# Patient Record
Sex: FEMALE | Race: WHITE | NOT HISPANIC OR LATINO | URBAN - METROPOLITAN AREA
[De-identification: names, ages, dates, MRNs, and addresses within clinical notes are randomized per-mention and may not be internally consistent; named-entity substitution may affect disease eponyms.]

---

## 2019-03-11 ENCOUNTER — INPATIENT (INPATIENT)
Facility: HOSPITAL | Age: 79
LOS: 2 days | Discharge: HOME | DRG: 247 | End: 2019-03-14
Attending: INTERNAL MEDICINE | Admitting: INTERNAL MEDICINE
Payer: COMMERCIAL

## 2019-03-11 VITALS
OXYGEN SATURATION: 98 % | DIASTOLIC BLOOD PRESSURE: 77 MMHG | HEART RATE: 64 BPM | TEMPERATURE: 98 F | RESPIRATION RATE: 18 BRPM | SYSTOLIC BLOOD PRESSURE: 129 MMHG

## 2019-03-11 DIAGNOSIS — Z90.49 ACQUIRED ABSENCE OF OTHER SPECIFIED PARTS OF DIGESTIVE TRACT: Chronic | ICD-10-CM

## 2019-03-11 DIAGNOSIS — I21.4 NON-ST ELEVATION (NSTEMI) MYOCARDIAL INFARCTION: ICD-10-CM

## 2019-03-11 PROBLEM — Z00.00 ENCOUNTER FOR PREVENTIVE HEALTH EXAMINATION: Status: ACTIVE | Noted: 2019-03-11

## 2019-03-11 LAB
ALBUMIN SERPL ELPH-MCNC: 3.4 G/DL — SIGNIFICANT CHANGE UP (ref 3.3–5)
ALP SERPL-CCNC: 93 U/L — SIGNIFICANT CHANGE UP (ref 40–120)
ALT FLD-CCNC: 12 U/L — SIGNIFICANT CHANGE UP (ref 10–45)
ANION GAP SERPL CALC-SCNC: 12 MMOL/L — SIGNIFICANT CHANGE UP (ref 5–17)
APTT BLD: 74.2 SEC — HIGH (ref 27.5–36.3)
AST SERPL-CCNC: 18 U/L — SIGNIFICANT CHANGE UP (ref 10–40)
BILIRUB SERPL-MCNC: 0.5 MG/DL — SIGNIFICANT CHANGE UP (ref 0.2–1.2)
BUN SERPL-MCNC: 9 MG/DL — SIGNIFICANT CHANGE UP (ref 7–23)
C DIFF GDH STL QL: NEGATIVE — SIGNIFICANT CHANGE UP
C DIFF GDH STL QL: SIGNIFICANT CHANGE UP
CALCIUM SERPL-MCNC: 8.3 MG/DL — LOW (ref 8.4–10.5)
CHLORIDE SERPL-SCNC: 110 MMOL/L — HIGH (ref 96–108)
CHOLEST SERPL-MCNC: 143 MG/DL — SIGNIFICANT CHANGE UP (ref 10–199)
CO2 SERPL-SCNC: 22 MMOL/L — SIGNIFICANT CHANGE UP (ref 22–31)
CREAT SERPL-MCNC: 0.63 MG/DL — SIGNIFICANT CHANGE UP (ref 0.5–1.3)
GLUCOSE SERPL-MCNC: 108 MG/DL — HIGH (ref 70–99)
HCT VFR BLD CALC: 36.7 % — SIGNIFICANT CHANGE UP (ref 34.5–45)
HDLC SERPL-MCNC: 46 MG/DL — LOW
HGB BLD-MCNC: 12.5 G/DL — SIGNIFICANT CHANGE UP (ref 11.5–15.5)
INR BLD: 1.11 RATIO — SIGNIFICANT CHANGE UP (ref 0.88–1.16)
LIPID PNL WITH DIRECT LDL SERPL: 78 MG/DL — SIGNIFICANT CHANGE UP
MAGNESIUM SERPL-MCNC: 2 MG/DL — SIGNIFICANT CHANGE UP (ref 1.6–2.6)
MCHC RBC-ENTMCNC: 30.6 PG — SIGNIFICANT CHANGE UP (ref 27–34)
MCHC RBC-ENTMCNC: 34.1 GM/DL — SIGNIFICANT CHANGE UP (ref 32–36)
MCV RBC AUTO: 89.9 FL — SIGNIFICANT CHANGE UP (ref 80–100)
PLATELET # BLD AUTO: 210 K/UL — SIGNIFICANT CHANGE UP (ref 150–400)
POTASSIUM SERPL-MCNC: 3.7 MMOL/L — SIGNIFICANT CHANGE UP (ref 3.5–5.3)
POTASSIUM SERPL-SCNC: 3.7 MMOL/L — SIGNIFICANT CHANGE UP (ref 3.5–5.3)
PROT SERPL-MCNC: 5.8 G/DL — LOW (ref 6–8.3)
PROTHROM AB SERPL-ACNC: 12.7 SEC — SIGNIFICANT CHANGE UP (ref 10–12.9)
RBC # BLD: 4.08 M/UL — SIGNIFICANT CHANGE UP (ref 3.8–5.2)
RBC # FLD: 12.5 % — SIGNIFICANT CHANGE UP (ref 10.3–14.5)
SODIUM SERPL-SCNC: 144 MMOL/L — SIGNIFICANT CHANGE UP (ref 135–145)
TOTAL CHOLESTEROL/HDL RATIO MEASUREMENT: 3.1 RATIO — LOW (ref 3.3–7.1)
TRIGL SERPL-MCNC: 97 MG/DL — SIGNIFICANT CHANGE UP (ref 10–149)
WBC # BLD: 12.5 K/UL — HIGH (ref 3.8–10.5)
WBC # FLD AUTO: 12.5 K/UL — HIGH (ref 3.8–10.5)

## 2019-03-11 PROCEDURE — 93010 ELECTROCARDIOGRAM REPORT: CPT

## 2019-03-11 PROCEDURE — 99152 MOD SED SAME PHYS/QHP 5/>YRS: CPT | Mod: GC

## 2019-03-11 PROCEDURE — 93458 L HRT ARTERY/VENTRICLE ANGIO: CPT | Mod: 26,59,GC

## 2019-03-11 PROCEDURE — 92941 PRQ TRLML REVSC TOT OCCL AMI: CPT | Mod: LC,GC

## 2019-03-11 PROCEDURE — 99223 1ST HOSP IP/OBS HIGH 75: CPT

## 2019-03-11 RX ORDER — ATORVASTATIN CALCIUM 80 MG/1
80 TABLET, FILM COATED ORAL AT BEDTIME
Qty: 0 | Refills: 0 | Status: DISCONTINUED | OUTPATIENT
Start: 2019-03-11 | End: 2019-03-14

## 2019-03-11 RX ORDER — LOSARTAN/HYDROCHLOROTHIAZIDE 100MG-25MG
1 TABLET ORAL
Qty: 0 | Refills: 0 | COMMUNITY

## 2019-03-11 RX ORDER — ASPIRIN/CALCIUM CARB/MAGNESIUM 324 MG
1 TABLET ORAL
Qty: 0 | Refills: 0 | COMMUNITY

## 2019-03-11 RX ORDER — METOPROLOL TARTRATE 50 MG
25 TABLET ORAL
Qty: 0 | Refills: 0 | Status: DISCONTINUED | OUTPATIENT
Start: 2019-03-11 | End: 2019-03-14

## 2019-03-11 RX ORDER — POTASSIUM CHLORIDE 20 MEQ
40 PACKET (EA) ORAL ONCE
Qty: 0 | Refills: 0 | Status: COMPLETED | OUTPATIENT
Start: 2019-03-11 | End: 2019-03-11

## 2019-03-11 RX ORDER — CHOLECALCIFEROL (VITAMIN D3) 125 MCG
1 CAPSULE ORAL
Qty: 0 | Refills: 0 | COMMUNITY

## 2019-03-11 RX ORDER — ASPIRIN/CALCIUM CARB/MAGNESIUM 324 MG
81 TABLET ORAL DAILY
Qty: 0 | Refills: 0 | Status: DISCONTINUED | OUTPATIENT
Start: 2019-03-11 | End: 2019-03-14

## 2019-03-11 RX ORDER — LEVOTHYROXINE SODIUM 125 MCG
50 TABLET ORAL DAILY
Qty: 0 | Refills: 0 | Status: DISCONTINUED | OUTPATIENT
Start: 2019-03-11 | End: 2019-03-14

## 2019-03-11 RX ORDER — TICAGRELOR 90 MG/1
90 TABLET ORAL
Qty: 0 | Refills: 0 | Status: DISCONTINUED | OUTPATIENT
Start: 2019-03-11 | End: 2019-03-14

## 2019-03-11 RX ORDER — AMLODIPINE BESYLATE 2.5 MG/1
1 TABLET ORAL
Qty: 0 | Refills: 0 | COMMUNITY

## 2019-03-11 RX ORDER — LEVOTHYROXINE SODIUM 125 MCG
1 TABLET ORAL
Qty: 0 | Refills: 0 | COMMUNITY

## 2019-03-11 RX ADMIN — ATORVASTATIN CALCIUM 80 MILLIGRAM(S): 80 TABLET, FILM COATED ORAL at 21:46

## 2019-03-11 RX ADMIN — Medication 25 MILLIGRAM(S): at 17:34

## 2019-03-11 RX ADMIN — TICAGRELOR 90 MILLIGRAM(S): 90 TABLET ORAL at 23:18

## 2019-03-11 RX ADMIN — Medication 40 MILLIEQUIVALENT(S): at 18:44

## 2019-03-11 NOTE — CHART NOTE - NSCHARTNOTEFT_GEN_A_CORE
Patient with 3 episodes of loose stool, mucous greenish color, since the time of the transfer from [facility].  Patient reports she had first episode prior the transfer after potassium supplementation for K 3.4 in [facility]. Patient's condition discussed with Dr Maciel and cardiac angiogram cancelled for this evening.  Patient was loaded with ASA 325mg and Plavix 600mg yesterday, started on heparin gtt and started on Brilinta 90mg for NSTEMI in [facility], heparin gtt will be discontinued this evening and will continue on Brilinta till angiogram tomorrow.  Patient will be placed under hospitalist.  Stool study pending at this time.       ZENAIDA Hairston  [employer]

## 2019-03-11 NOTE — H&P CARDIOLOGY - HISTORY OF PRESENT ILLNESS
Patient is 77yo  female, Druze with PMHx HTN (non complaint with her BP meds Norvasc 5mg (reports remote LE swelling), losartan/HCTZ 100mg-12.5mg), hypothyroidism who presented to [facility] on 3/3/10 with complaints of 7/10 CP for several days accompanied with dizziness and arm pain.  in ED hypertensive to 190/106, 220/104, Trop I 2.470 , CKMB 8.3-->6.3 , pro BNP 4227,, Chol 206, triglyceride 148 WBC 13.48 H/H14/43.5, trace LE in urinalysis, TSH WNL.  BP loaded with ASA and Plavix 600mg, started on heparin gtt.  Pt transferred to [facility] for further ischemic evaluation.  Pt denied dizziness, diaphoresis, palpitations, nausea, vomiting, peripheral edema, recent weight gain, or syncope. Patient is 77yo  female, Hinduism with PMHx HTN (non complaint with her BP meds Norvasc 5mg (reports remote LE swelling), losartan/HCTZ 100mg-12.5mg), hypothyroidism who presented to [facility] on 3/3/10 with complaints of 7/10 CP for several days accompanied with dizziness and arm pain.  in ED hypertensive to 190/106, 220/104, Trop I 2.470 , CKMB 8.3-->6.3 , pro BNP 4227,, Chol 206, triglyceride 148 WBC 13.48 H/H14/43.5, trace LE in urinalysis, TSH WNL.  BP loaded with ASA and Plavix 600mg, started on heparin gtt.  Pt transferred to [facility] for further ischemic evaluation.  Pt denied dizziness, diaphoresis, palpitations, nausea, vomiting, peripheral edema, recent weight gain, or syncope.  Pt reports 2 episodes of diarrhea after potassium supplementation today. Patient is 77yo  female, Mormon with PMHx HTN (non complaint with her BP meds Norvasc 5mg (reports remote LE swelling), losartan/HCTZ 100mg-12.5mg), hypothyroidism who presented to [facility] on 3/10 with complaints of 7/10 CP for several days accompanied with dizziness and arm pain.  in ED hypertensive to 190/106, 220/104, Trop I 2.470 , CKMB 8.3-->6.3 , pro BNP 4227,, Chol 206, triglyceride 148 WBC 13.48 H/H14/43.5, trace LE in urinalysis, TSH WNL.  BP loaded with ASA and Plavix 600mg, started on heparin gtt.  Pt transferred to [facility] for further ischemic evaluation.  Pt denied dizziness, diaphoresis, palpitations, nausea, vomiting, peripheral edema, recent weight gain, or syncope.  Pt reports 2 episodes of diarrhea after potassium supplementation today.

## 2019-03-11 NOTE — CHART NOTE - NSCHARTNOTEFT_GEN_A_CORE
Medicine Accept Note   Patient is a 78y old  Female who presents with a chief complaint of   HPI:  Patient is 77yo  female, Yazidism with PMHx HTN (non complaint with her BP meds Norvasc 5mg (reports remote LE swelling), losartan/HCTZ 100mg-12.5mg), hypothyroidism who presented to [facility] on 3/10 with complaints of 7/10 CP for several days accompanied with dizziness and arm pain.  in ED hypertensive to 190/106, 220/104, Trop I 2.470 , CKMB 8.3-->6.3 , pro BNP 4227,, Chol 206, triglyceride 148 WBC 13.48 H/H14/43.5, trace LE in urinalysis, TSH WNL.  BP loaded with ASA and Plavix 600mg, started on heparin gtt.  Pt transferred to [facility] for further ischemic evaluation.  Pt denied dizziness, diaphoresis, palpitations, nausea, vomiting, peripheral edema, recent weight gain, or syncope.  Pt reports 2 episodes of diarrhea after potassium supplementation today. (11 Mar 2019 14:36)    MEDICATIONS  (STANDING):  aspirin enteric coated 81 milliGRAM(s) Oral daily  atorvastatin 80 milliGRAM(s) Oral at bedtime  levothyroxine 50 MICROGram(s) Oral daily  metoprolol tartrate 25 milliGRAM(s) Oral two times a day  ticagrelor 90 milliGRAM(s) Oral two times a day    MEDICATIONS  (PRN):    Allergies    No Known Allergies    Intolerances    Norvasc (Swelling)    REVIEW OF SYSTEMS:    CONSTITUTIONAL: No weakness, fevers or chills  EYES/ENT: No visual changes;  No vertigo or throat pain   NECK: No pain or stiffness  RESPIRATORY: No cough, wheezing, hemoptysis; No shortness of breath  CARDIOVASCULAR: No chest pain or palpitations  GASTROINTESTINAL: No abdominal or epigastric pain. No nausea, vomiting, or hematemesis; No diarrhea or constipation. No melena or hematochezia.  GENITOURINARY: No dysuria, frequency or hematuria  NEUROLOGICAL: No numbness or weakness  SKIN: No itching, burning, rashes, or lesions   All other review of systems is negative unless indicated above.  .  VITAL SIGNS:  T(C): 36.4 (03-11-19 @ 21:08), Max: 36.8 (03-11-19 @ 14:36)  T(F): 97.6 (03-11-19 @ 21:08), Max: 98.2 (03-11-19 @ 14:36)  HR: 67 (03-11-19 @ 21:08) (64 - 76)  BP: 112/72 (03-11-19 @ 21:08) (112/72 - 140/77)  BP(mean): 98 (03-11-19 @ 14:36) (98 - 98)  RR: 14 (03-11-19 @ 21:08) (14 - 18)  SpO2: 96% (03-11-19 @ 21:08) (96% - 98%)  Wt(kg): --    PHYSICAL EXAM:    Constitutional: WDWN resting comfortably in bed; NAD  Head: NC/AT  Eyes: PERRL, EOMI, anicteric sclera  ENT: no nasal discharge; uvula midline, no oropharyngeal erythema or exudates; MMM  Neck: supple; no JVD or thyromegaly  Respiratory: CTA B/L; no W/R/R, no retractions  Cardiac: +S1/S2; RRR; no M/R/G; PMI non-displaced  Gastrointestinal: soft, NT/ND; no rebound or guarding; +BSx4  Genitourinary: normal external genitalia  Back: spine midline, no bony tenderness or step-offs; no CVAT B/L  Extremities: WWP, no clubbing or cyanosis; no peripheral edema  Musculoskeletal: NROM x4; no joint swelling, tenderness or erythema  Vascular: 2+ radial, femoral, DP/PT pulses B/L  Dermatologic: skin warm, dry and intact; no rashes, wounds, or scars  Lymphatic: no submandibular or cervical LAD  Neurologic: AAOx3; CNII-XII grossly intact; no focal deficits  Psychiatric: affect and characteristics of appearance, verbalizations, behaviors are appropriate      LABS:  Personally reviewed labs and noted in detail below                      12.5   12.5  )-----------( 210      ( 11 Mar 2019 16:32 )             36.7     03-11    144  |  110<H>  |  9   ----------------------------<  108<H>  3.7   |  22  |  0.63    Ca    8.3<L>      11 Mar 2019 16:32  Mg     2.0     03-11    TPro  5.8<L>  /  Alb  3.4  /  TBili  0.5  /  DBili  x   /  AST  18  /  ALT  12  /  AlkPhos  93  03-11    CAPILLARY BLOOD GLUCOSE        PT/INR - ( 11 Mar 2019 16:32 )   PT: 12.7 sec;   INR: 1.11 ratio         PTT - ( 11 Mar 2019 16:32 )  PTT:74.2 sec          CULTURES  RECENT CULTURES: Medicine Accept Note   Patient is a 78y old  Female who presents with a chief complaint of chest pain    HPI:  Patient is 77yo  female, Jain with PMHx HTN (non complaint with her BP meds Norvasc 5mg (reports remote LE swelling), losartan/HCTZ 100mg-12.5mg), hypothyroidism who presented to [facility] on 3/10 with complaints of 7/10 CP for several days accompanied with dizziness and arm pain.  in ED hypertensive to 190/106, 220/104, Trop I 2.470 , CKMB 8.3-->6.3 , pro BNP 4227,, Chol 206, triglyceride 148 WBC 13.48 H/H14/43.5, trace LE in urinalysis, TSH WNL.  BP loaded with ASA and Plavix 600mg, started on heparin gtt.  Pt transferred to [facility] for further ischemic evaluation.  Pt denied dizziness, diaphoresis, palpitations, nausea, vomiting, peripheral edema, recent weight gain, or syncope.  Pt reports 2 episodes of diarrhea after potassium supplementation today. (11 Mar 2019 14:36)    MEDICATIONS  (STANDING):  aspirin enteric coated 81 milliGRAM(s) Oral daily  atorvastatin 80 milliGRAM(s) Oral at bedtime  levothyroxine 50 MICROGram(s) Oral daily  metoprolol tartrate 25 milliGRAM(s) Oral two times a day  ticagrelor 90 milliGRAM(s) Oral two times a day    MEDICATIONS  (PRN):    Allergies    No Known Allergies    Intolerances    Norvasc (Swelling)    REVIEW OF SYSTEMS:    VITAL SIGNS:  T(C): 36.4 (03-11-19 @ 21:08), Max: 36.8 (03-11-19 @ 14:36)  T(F): 97.6 (03-11-19 @ 21:08), Max: 98.2 (03-11-19 @ 14:36)  HR: 67 (03-11-19 @ 21:08) (64 - 76)  BP: 112/72 (03-11-19 @ 21:08) (112/72 - 140/77)  BP(mean): 98 (03-11-19 @ 14:36) (98 - 98)  RR: 14 (03-11-19 @ 21:08) (14 - 18)  SpO2: 96% (03-11-19 @ 21:08) (96% - 98%)  Wt(kg): --    PHYSICAL EXAM:        LABS:  Personally reviewed labs and noted in detail below                      12.5   12.5  )-----------( 210      ( 11 Mar 2019 16:32 )             36.7     03-11    144  |  110<H>  |  9   ----------------------------<  108<H>  3.7   |  22  |  0.63    Ca    8.3<L>      11 Mar 2019 16:32  Mg     2.0     03-11    TPro  5.8<L>  /  Alb  3.4  /  TBili  0.5  /  DBili  x   /  AST  18  /  ALT  12  /  AlkPhos  93  03-11    CAPILLARY BLOOD GLUCOSE    PT/INR - ( 11 Mar 2019 16:32 )   PT: 12.7 sec;   INR: 1.11 ratio      PTT - ( 11 Mar 2019 16:32 )  PTT:74.2 sec        CULTURES  RECENT CULTURES:    Personally reviewed EKG    ASSESSMENT: 77 yo woman, Jain with PMHx HTN p/w chest pain to [facility] transferred to [facility] for ishchemic eval transferred to medicine service to work up diarrhea.     PLAN: Medicine Accept Note   Patient is a 78y old  Female who presents with a chief complaint of chest pain    HPI:  Patient is 77 yo  female, Protestant with PMH of HTN (non complaint with her BP meds Norvasc 5mg (reports remote LE swelling), losartan/HCTZ 100mg-12.5mg), hypothyroidism who presented to [facility] on 3/10/2019 with complaints of intermittent 7/10 chest pain for ~3 days accompanied with dizziness and left arm pain. Patient endorsed intermittent nausea without diaphoresis, shortness of breath, palpitations or emesis. In [facility] ED, patient hypertensive to 190/106, 220/104, Trop I 2.470 , CKMB 8.3-->6.3 , pro BNP 4227, Chol 206, triglyceride 148 WBC 13.48 H/H14/43.5, trace LE in urinalysis, TSH WNL.  Patient was loaded with ASA and Plavix 600mg, started on heparin gtt. Patient was also initiated on Metoprolol, Imdur for BP control Pt transferred to [facility] on 3/11/2019 for further ischemic evaluation.  Pt reports 2 episodes of diarrhea today prior to transfer and had repeated bouts when transferred to [facility]. Cardiology team deferred cath until 3/12 and transferred to medicine service to investigate diarrhea. Patient denies abdominal pain, nausea/emesis, fevers, chills, sweats. Denies recent antibiotic use. While at [facility], patient denies recurrence of CP, arm pain, dizziness.       PAST MEDICAL & SURGICAL HISTORY:  Hypothyroid  HTN (hypertension)  History of cholecystectomy    FAMILY HISTORY:  Family history of early CAD (Mother): mother    SOCIAL HISTORY:    Former smoker quit 35 years ago  Denies EtOH use  Denies recreational drug use    HOME MEDICATIONS:  Levothyroxine 50 mg oral daily  Losartan/HCTZ 100-12.5 mg oral daily  Norvasc 5mg oral daily    MEDICATIONS  (STANDING):  aspirin enteric coated 81 milliGRAM(s) Oral daily  atorvastatin 80 milliGRAM(s) Oral at bedtime  levothyroxine 50 MICROGram(s) Oral daily  metoprolol tartrate 25 milliGRAM(s) Oral two times a day  ticagrelor 90 milliGRAM(s) Oral two times a day    MEDICATIONS  (PRN):    Allergies    No Known Allergies    Intolerances    Norvasc (Swelling)    REVIEW OF SYSTEMS:  CONSTITUTIONAL: No fever, chills, sweats or fatigue  EYES: No vision changes  ENMT:  No sinus congestions, rhinorrhea, or throat pain  NECK: No pain or stiffness  RESPIRATORY: No cough or shortness of breath  CARDIOVASCULAR: No current chest pain, palpitations, dizziness, or leg swelling  GASTROINTESTINAL: No abdominal pain. No nausea, vomiting. Hx of constipation. + Diarrhea No melena or hematochezia.  GENITOURINARY: No dysuria,   NEUROLOGICAL: No headaches, loss of strength, numbness, or tremors  SKIN: No rashes or lesions   LYMPH NODES: No enlarged glands  MUSCULOSKELETAL: No joint pain or swelling; No muscle, back, or extremity pain  HEME/LYMPH: No easy bruising  ALLERGY AND IMMUNOLOGIC: hx of leg swelling     VITAL SIGNS:  T(C): 36.4 (03-11-19 @ 21:08), Max: 36.8 (03-11-19 @ 14:36)  T(F): 97.6 (03-11-19 @ 21:08), Max: 98.2 (03-11-19 @ 14:36)  HR: 67 (03-11-19 @ 21:08) (64 - 76)  BP: 112/72 (03-11-19 @ 21:08) (112/72 - 140/77)  BP(mean): 98 (03-11-19 @ 14:36) (98 - 98)  RR: 14 (03-11-19 @ 21:08) (14 - 18)  SpO2: 96% (03-11-19 @ 21:08) (96% - 98%)  Wt(kg): --    PHYSICAL EXAM:  GENERAL: NAD, well-groomed, well-developed  HEAD:  Atraumatic, Normocephalic  EYES: EOMI, PERRLA, conjunctiva and sclera clear  ENMT: Moist mucous membranes  NECK: Supple, No JVD, Normal thyroid  NERVOUS SYSTEM:  Alert & Oriented X3, Good concentration; Motor Strength 5/5 B/L upper and lower extremities  CHEST/LUNG: Clear to percussion bilaterally; No rales, rhonchi, wheezing, or rubs  HEART: Regular rate and rhythm +S1 S2; No murmurs, rubs, or gallops  ABDOMEN: Soft, Nontender, Nondistended, No guarding, No rebound; Bowel sounds present  EXTREMITIES:  2+ Peripheral Pulses, No clubbing, cyanosis, or edema  LYMPH: No lymphadenopathy noted  SKIN: No rashes or lesions    LABS:  Personally reviewed labs and noted in detail below                      12.5   12.5  )-----------( 210      ( 11 Mar 2019 16:32 )             36.7     03-11    144  |  110<H>  |  9   ----------------------------<  108<H>  3.7   |  22  |  0.63    Ca    8.3<L>      11 Mar 2019 16:32  Mg     2.0     03-11    TPro  5.8<L>  /  Alb  3.4  /  TBili  0.5  /  DBili  x   /  AST  18  /  ALT  12  /  AlkPhos  93  03-11    CAPILLARY BLOOD GLUCOSE    PT/INR - ( 11 Mar 2019 16:32 )   PT: 12.7 sec;   INR: 1.11 ratio      PTT - ( 11 Mar 2019 16:32 )  PTT:74.2 sec    C. difficile GDH &amp; toxins A/B by EIA . (03.11.19 @ 18:37)    Clostridium difficile GDH Toxins A&amp;B, EIA:   Negative    Clostridium difficile GDH Interpretation: Negative for toxigenic C. Difficile.  This specimen is negative for C.  Difficile glutamate dehydrogenase (GDH) antigen and negative for C.  Difficile Toxins A & B, by EIA.  GDH is a highly sensitive screening  marker for C. Difficile that is produced in large amounts by all C.  Difficile strains, both toxigenic and nontoxigenic.  This assay has not  been validated as a test of cure.  Repeat testing during the same episode  of diarrhea is of limited value and is discouraged.  The results of this  assay should always be interpreted in conjunction with pateint's clinical  history.    Reviewed labs at [facility]:  Trop I 2.470 , CKMB 8.3-->6.3 , pro BNP 4227, Chol 206, triglyceride 148 WBC 13.48 H/H14/43.5, trace LE in urinalysis, TSH WNL (2.4).    Personally reviewed EKG: SR 62 bpm  RBBB, TW inversion V3- V6    ASSESSMENT: 77 yo woman, Protestant with PMHx HTN p/w chest pain to [facility] transferred to [facility] for ischemic eval transferred to medicine service to work up diarrhea.     PLAN:  #Chest pain/NSTEMI  Patient transferred to [facility] for ischemic eval in setting of presumed NSTEMI. Per cardiology team: cardiac angiogram cancelled for this evening.  Patient currently asymptomatic    -heparin gtt will be discontinued this evening  -Patient started on Brilinta 90mg for NSTEMI in [facility], continue Brilinta till angiogram tomorrow.   -Monitor on tele  -continue Lipitor 80, Aspirin 81    #Diarrhea  -Per patient, feels that medications and poor PO intake may have caused diarrhea  -C. diff negative  -Check stool PCR    #HTN:  Patient's pressures currently well controlled. Continue Metoprolol    #Hypokalemia resolved currently  Monitor K and Mg and replete accordingly    # DVT ppx    Patient was previously unknown to me. Patient was assigned to me by hospitalist in charge. My involvement in this case only consisted of initial history, physical and management plan. Primary medicine team to assume care in AM and thereafter. Case discussed in detail with overnight NP/PA Mckay [id number]

## 2019-03-11 NOTE — CHART NOTE - NSCHARTNOTEFT_GEN_A_CORE
Patient with another episode of loose stool, reports its less watery after dinner.  Family just shared that patient has history of diverticulitis, not on any meds, denies recent flair ups.  Patient under hospitalist care.  Discussed plan of care with patient and her two sons at bedside.

## 2019-03-12 LAB
ANION GAP SERPL CALC-SCNC: 12 MMOL/L — SIGNIFICANT CHANGE UP (ref 5–17)
BUN SERPL-MCNC: 8 MG/DL — SIGNIFICANT CHANGE UP (ref 7–23)
CALCIUM SERPL-MCNC: 8.9 MG/DL — SIGNIFICANT CHANGE UP (ref 8.4–10.5)
CHLORIDE SERPL-SCNC: 111 MMOL/L — HIGH (ref 96–108)
CK SERPL-CCNC: 239 U/L — HIGH (ref 25–170)
CO2 SERPL-SCNC: 23 MMOL/L — SIGNIFICANT CHANGE UP (ref 22–31)
CREAT SERPL-MCNC: 0.68 MG/DL — SIGNIFICANT CHANGE UP (ref 0.5–1.3)
GLUCOSE SERPL-MCNC: 111 MG/DL — HIGH (ref 70–99)
HCT VFR BLD CALC: 40 % — SIGNIFICANT CHANGE UP (ref 34.5–45)
HGB BLD-MCNC: 13.3 G/DL — SIGNIFICANT CHANGE UP (ref 11.5–15.5)
MAGNESIUM SERPL-MCNC: 2.1 MG/DL — SIGNIFICANT CHANGE UP (ref 1.6–2.6)
MCHC RBC-ENTMCNC: 30.1 PG — SIGNIFICANT CHANGE UP (ref 27–34)
MCHC RBC-ENTMCNC: 33.2 GM/DL — SIGNIFICANT CHANGE UP (ref 32–36)
MCV RBC AUTO: 90.6 FL — SIGNIFICANT CHANGE UP (ref 80–100)
PLATELET # BLD AUTO: 232 K/UL — SIGNIFICANT CHANGE UP (ref 150–400)
POTASSIUM SERPL-MCNC: 4.4 MMOL/L — SIGNIFICANT CHANGE UP (ref 3.5–5.3)
POTASSIUM SERPL-SCNC: 4.4 MMOL/L — SIGNIFICANT CHANGE UP (ref 3.5–5.3)
RBC # BLD: 4.41 M/UL — SIGNIFICANT CHANGE UP (ref 3.8–5.2)
RBC # FLD: 12.8 % — SIGNIFICANT CHANGE UP (ref 10.3–14.5)
SODIUM SERPL-SCNC: 146 MMOL/L — HIGH (ref 135–145)
TROPONIN T, HIGH SENSITIVITY RESULT: 366 NG/L — HIGH (ref 0–51)
WBC # BLD: 10.8 K/UL — HIGH (ref 3.8–10.5)
WBC # FLD AUTO: 10.8 K/UL — HIGH (ref 3.8–10.5)

## 2019-03-12 PROCEDURE — 93010 ELECTROCARDIOGRAM REPORT: CPT

## 2019-03-12 PROCEDURE — 93306 TTE W/DOPPLER COMPLETE: CPT | Mod: 26

## 2019-03-12 RX ADMIN — TICAGRELOR 90 MILLIGRAM(S): 90 TABLET ORAL at 06:20

## 2019-03-12 RX ADMIN — TICAGRELOR 90 MILLIGRAM(S): 90 TABLET ORAL at 18:21

## 2019-03-12 RX ADMIN — Medication 81 MILLIGRAM(S): at 06:20

## 2019-03-12 RX ADMIN — Medication 50 MICROGRAM(S): at 06:20

## 2019-03-12 RX ADMIN — Medication 25 MILLIGRAM(S): at 18:21

## 2019-03-12 RX ADMIN — ATORVASTATIN CALCIUM 80 MILLIGRAM(S): 80 TABLET, FILM COATED ORAL at 21:35

## 2019-03-12 RX ADMIN — Medication 25 MILLIGRAM(S): at 06:20

## 2019-03-12 NOTE — PROGRESS NOTE ADULT - ASSESSMENT
79 yo female h/o htn, a/w chest pain and NSTEMI    cards f/u  awaiting cath  trend CE  tele  echo  asa/statin    diarrhea  resolved    htn  resume home meds  monitor    dvt ppx

## 2019-03-12 NOTE — PROGRESS NOTE ADULT - SUBJECTIVE AND OBJECTIVE BOX
DOMITILA MICHELLE  78y Female  MRN:[id number]    Patient is a 78y old  Female who presents with a chief complaint of NSTEMI  HPI:  Patient is 79yo  female, Jew with PMHx HTN (non complaint with her BP meds Norvasc 5mg (reports remote LE swelling), losartan/HCTZ 100mg-12.5mg), hypothyroidism who presented to [facility] on 3/10 with complaints of 7/10 CP for several days accompanied with dizziness and arm pain.  in ED hypertensive to 190/106, 220/104, Trop I 2.470 , CKMB 8.3-->6.3 , pro BNP 4227,, Chol 206, triglyceride 148 WBC 13.48 H/H14/43.5, trace LE in urinalysis, TSH WNL.  BP loaded with ASA and Plavix 600mg, started on heparin gtt.  Pt transferred to [facility] for further ischemic evaluation.  Pt denied dizziness, diaphoresis, palpitations, nausea, vomiting, peripheral edema, recent weight gain, or syncope.  Pt reports 2 episodes of diarrhea after potassium supplementation today. (11 Mar 2019 14:36)      Patient seen and evaluated at bedside. No acute events overnight except as noted.    Interval HPI: no further chest pain today    PAST MEDICAL & SURGICAL HISTORY:  Hypothyroid  HTN (hypertension)  History of cholecystectomy      REVIEW OF SYSTEMS:  as per hpi    VITALS:  Vital Signs Last 24 Hrs  T(C): 37 (12 Mar 2019 11:22), Max: 37 (12 Mar 2019 11:22)  T(F): 98.6 (12 Mar 2019 11:22), Max: 98.6 (12 Mar 2019 11:22)  HR: 65 (12 Mar 2019 11:22) (65 - 82)  BP: 166/96 (12 Mar 2019 11:22) (112/72 - 166/96)  BP(mean): 98 (11 Mar 2019 14:36) (98 - 98)  RR: 16 (12 Mar 2019 11:22) (14 - 16)  SpO2: 97% (12 Mar 2019 11:22) (95% - 97%)  CAPILLARY BLOOD GLUCOSE        I&O's Summary    11 Mar 2019 07:01  -  12 Mar 2019 07:00  --------------------------------------------------------  IN: 200 mL / OUT: 250 mL / NET: -50 mL    12 Mar 2019 07:01  -  12 Mar 2019 14:28  --------------------------------------------------------  IN: 240 mL / OUT: 0 mL / NET: 240 mL        PHYSICAL EXAM:  GENERAL: NAD, well-developed  HEAD:  Atraumatic, Normocephalic  EYES: EOMI, PERRLA, conjunctiva and sclera clear  NECK: Supple, No JVD  CHEST/LUNG: Clear to auscultation bilaterally; No wheeze  HEART: S1, S2; No murmurs, rubs, or gallops  ABDOMEN: Soft, Nontender, Nondistended; Bowel sounds present  EXTREMITIES:  2+ Peripheral Pulses, No clubbing, cyanosis, or edema  PSYCH: Normal affect  NEUROLOGY: AAOX3; non-focal  SKIN: No rashes or lesions    Consultant(s) Notes Reviewed:  [x ] YES  [ ] NO  Care Discussed with Consultants/Other Providers [ x] YES  [ ] NO    MEDS:  MEDICATIONS  (STANDING):  aspirin enteric coated 81 milliGRAM(s) Oral daily  atorvastatin 80 milliGRAM(s) Oral at bedtime  levothyroxine 50 MICROGram(s) Oral daily  metoprolol tartrate 25 milliGRAM(s) Oral two times a day  ticagrelor 90 milliGRAM(s) Oral two times a day    MEDICATIONS  (PRN):    ALLERGIES:  No Known Allergies  Norvasc (Swelling)      LABS:                        13.3   10.8  )-----------( 232      ( 12 Mar 2019 06:41 )             40.0     03-12    146<H>  |  111<H>  |  8   ----------------------------<  111<H>  4.4   |  23  |  0.68    Ca    8.9      12 Mar 2019 06:41  Mg     2.1     03-12    TPro  5.8<L>  /  Alb  3.4  /  TBili  0.5  /  DBili  x   /  AST  18  /  ALT  12  /  AlkPhos  93  03-11    PT/INR - ( 11 Mar 2019 16:32 )   PT: 12.7 sec;   INR: 1.11 ratio         PTT - ( 11 Mar 2019 16:32 )  PTT:74.2 sec  CARDIAC MARKERS ( 12 Mar 2019 06:41 )  x     / x     / 239 U/L / x     / x          LIVER FUNCTIONS - ( 11 Mar 2019 16:32 )  Alb: 3.4 g/dL / Pro: 5.8 g/dL / ALK PHOS: 93 U/L / ALT: 12 U/L / AST: 18 U/L / GGT: x

## 2019-03-13 ENCOUNTER — TRANSCRIPTION ENCOUNTER (OUTPATIENT)
Age: 79
End: 2019-03-13

## 2019-03-13 LAB
ANION GAP SERPL CALC-SCNC: 13 MMOL/L — SIGNIFICANT CHANGE UP (ref 5–17)
BUN SERPL-MCNC: 15 MG/DL — SIGNIFICANT CHANGE UP (ref 7–23)
CALCIUM SERPL-MCNC: 9.2 MG/DL — SIGNIFICANT CHANGE UP (ref 8.4–10.5)
CHLORIDE SERPL-SCNC: 108 MMOL/L — SIGNIFICANT CHANGE UP (ref 96–108)
CO2 SERPL-SCNC: 23 MMOL/L — SIGNIFICANT CHANGE UP (ref 22–31)
CREAT SERPL-MCNC: 0.78 MG/DL — SIGNIFICANT CHANGE UP (ref 0.5–1.3)
GLUCOSE SERPL-MCNC: 120 MG/DL — HIGH (ref 70–99)
HCT VFR BLD CALC: 41.6 % — SIGNIFICANT CHANGE UP (ref 34.5–45)
HGB BLD-MCNC: 14.2 G/DL — SIGNIFICANT CHANGE UP (ref 11.5–15.5)
MCHC RBC-ENTMCNC: 30.8 PG — SIGNIFICANT CHANGE UP (ref 27–34)
MCHC RBC-ENTMCNC: 34.2 GM/DL — SIGNIFICANT CHANGE UP (ref 32–36)
MCV RBC AUTO: 90 FL — SIGNIFICANT CHANGE UP (ref 80–100)
PLATELET # BLD AUTO: 224 K/UL — SIGNIFICANT CHANGE UP (ref 150–400)
POTASSIUM SERPL-MCNC: 3.9 MMOL/L — SIGNIFICANT CHANGE UP (ref 3.5–5.3)
POTASSIUM SERPL-SCNC: 3.9 MMOL/L — SIGNIFICANT CHANGE UP (ref 3.5–5.3)
RBC # BLD: 4.62 M/UL — SIGNIFICANT CHANGE UP (ref 3.8–5.2)
RBC # FLD: 12.7 % — SIGNIFICANT CHANGE UP (ref 10.3–14.5)
SODIUM SERPL-SCNC: 144 MMOL/L — SIGNIFICANT CHANGE UP (ref 135–145)
WBC # BLD: 11.4 K/UL — HIGH (ref 3.8–10.5)
WBC # FLD AUTO: 11.4 K/UL — HIGH (ref 3.8–10.5)

## 2019-03-13 PROCEDURE — 92928 PRQ TCAT PLMT NTRAC ST 1 LES: CPT | Mod: RI,GC

## 2019-03-13 PROCEDURE — 93010 ELECTROCARDIOGRAM REPORT: CPT

## 2019-03-13 RX ORDER — ATORVASTATIN CALCIUM 80 MG/1
1 TABLET, FILM COATED ORAL
Qty: 30 | Refills: 0 | OUTPATIENT
Start: 2019-03-13 | End: 2019-04-11

## 2019-03-13 RX ORDER — ACETAMINOPHEN 500 MG
650 TABLET ORAL EVERY 6 HOURS
Qty: 0 | Refills: 0 | Status: DISCONTINUED | OUTPATIENT
Start: 2019-03-13 | End: 2019-03-14

## 2019-03-13 RX ORDER — ATORVASTATIN CALCIUM 80 MG/1
1 TABLET, FILM COATED ORAL
Qty: 0 | Refills: 0 | COMMUNITY

## 2019-03-13 RX ORDER — METOPROLOL TARTRATE 50 MG
1 TABLET ORAL
Qty: 0 | Refills: 0 | COMMUNITY

## 2019-03-13 RX ORDER — LISINOPRIL 2.5 MG/1
2.5 TABLET ORAL DAILY
Qty: 0 | Refills: 0 | Status: DISCONTINUED | OUTPATIENT
Start: 2019-03-13 | End: 2019-03-13

## 2019-03-13 RX ORDER — METOPROLOL TARTRATE 50 MG
1 TABLET ORAL
Qty: 0 | Refills: 0 | COMMUNITY
Start: 2019-03-13

## 2019-03-13 RX ORDER — TICAGRELOR 90 MG/1
1 TABLET ORAL
Qty: 0 | Refills: 0 | COMMUNITY

## 2019-03-13 RX ORDER — HEPARIN SODIUM 5000 [USP'U]/ML
12 INJECTION INTRAVENOUS; SUBCUTANEOUS
Qty: 0 | Refills: 0 | COMMUNITY

## 2019-03-13 RX ORDER — TICAGRELOR 90 MG/1
1 TABLET ORAL
Qty: 60 | Refills: 0 | OUTPATIENT
Start: 2019-03-13 | End: 2019-04-11

## 2019-03-13 RX ADMIN — Medication 81 MILLIGRAM(S): at 05:59

## 2019-03-13 RX ADMIN — Medication 25 MILLIGRAM(S): at 05:59

## 2019-03-13 RX ADMIN — Medication 650 MILLIGRAM(S): at 16:53

## 2019-03-13 RX ADMIN — TICAGRELOR 90 MILLIGRAM(S): 90 TABLET ORAL at 18:16

## 2019-03-13 RX ADMIN — Medication 650 MILLIGRAM(S): at 17:53

## 2019-03-13 RX ADMIN — ATORVASTATIN CALCIUM 80 MILLIGRAM(S): 80 TABLET, FILM COATED ORAL at 22:19

## 2019-03-13 RX ADMIN — Medication 25 MILLIGRAM(S): at 18:16

## 2019-03-13 RX ADMIN — TICAGRELOR 90 MILLIGRAM(S): 90 TABLET ORAL at 05:59

## 2019-03-13 RX ADMIN — Medication 50 MICROGRAM(S): at 05:59

## 2019-03-13 NOTE — CHART NOTE - NSCHARTNOTEFT_GEN_A_CORE
Patient underwent a PCI procedure and is being admitted as they are at increased risk for major adverse cardiac and vascular events if discharged due to the following high risk characteristics:      patient is already an inpatient    Amol Phillips, NP  x [phone]

## 2019-03-13 NOTE — PROGRESS NOTE ADULT - ASSESSMENT
Patient is a 78y old  Female who presents with  NSTEMI (12 Mar 2019 14:28) now s/p C AMRIT x 1 OM1 via right radial access. Pt tolerated the procedure site benign. Staged PCI to ramus pending

## 2019-03-13 NOTE — DISCHARGE NOTE PROVIDER - CARE PROVIDER_API CALL
Chapo Wiseman)  Cardiology; Internal Medicine  [address] [place] [address]  Phone: [phone]  Fax: [phone]  Follow Up Time: 2 weeks

## 2019-03-13 NOTE — PROGRESS NOTE ADULT - SUBJECTIVE AND OBJECTIVE BOX
DOMITILA MICHELLE  78y Female  MRN:[id number]    Patient is a 78y old  Female who presents with a chief complaint of NSTEMI  HPI:  Patient is 77yo  female, Gnosticism with PMHx HTN (non complaint with her BP meds Norvasc 5mg (reports remote LE swelling), losartan/HCTZ 100mg-12.5mg), hypothyroidism who presented to [facility] on 3/10 with complaints of 7/10 CP for several days accompanied with dizziness and arm pain.  in ED hypertensive to 190/106, 220/104, Trop I 2.470 , CKMB 8.3-->6.3 , pro BNP 4227,, Chol 206, triglyceride 148 WBC 13.48 H/H14/43.5, trace LE in urinalysis, TSH WNL.  BP loaded with ASA and Plavix 600mg, started on heparin gtt.  Pt transferred to [facility] for further ischemic evaluation.  Pt denied dizziness, diaphoresis, palpitations, nausea, vomiting, peripheral edema, recent weight gain, or syncope.  Pt reports 2 episodes of diarrhea after potassium supplementation today. (11 Mar 2019 14:36)      Patient seen and evaluated at bedside. No acute events overnight except as noted.    Interval HPI: s/p cath with pci, for staged pci today    PAST MEDICAL & SURGICAL HISTORY:  Hypothyroid  HTN (hypertension)  History of cholecystectomy      REVIEW OF SYSTEMS:  as per hpi    VITALS:  Vital Signs Last 24 Hrs  T(C): 36.7 (13 Mar 2019 11:14), Max: 36.8 (12 Mar 2019 20:10)  T(F): 98 (13 Mar 2019 11:14), Max: 98.2 (12 Mar 2019 20:10)  HR: 73 (13 Mar 2019 16:30) (59 - 79)  BP: 155/80 (13 Mar 2019 16:30) (129/74 - 179/74)  BP(mean): --  RR: 16 (13 Mar 2019 16:30) (16 - 17)  SpO2: 94% (13 Mar 2019 16:30) (94% - 97%)        PHYSICAL EXAM:  GENERAL: NAD, well-developed  HEAD:  Atraumatic, Normocephalic  EYES: EOMI, PERRLA, conjunctiva and sclera clear  NECK: Supple, No JVD  CHEST/LUNG: Clear to auscultation bilaterally; No wheeze  HEART: S1, S2; No murmurs, rubs, or gallops  ABDOMEN: Soft, Nontender, Nondistended; Bowel sounds present  EXTREMITIES:  2+ Peripheral Pulses, No clubbing, cyanosis, or edema  PSYCH: Normal affect  NEUROLOGY: AAOX3; non-focal  SKIN: No rashes or lesions    Consultant(s) Notes Reviewed:  [x ] YES  [ ] NO  Care Discussed with Consultants/Other Providers [ x] YES  [ ] NO    MEDS:  MEDICATIONS  (STANDING):  aspirin enteric coated 81 milliGRAM(s) Oral daily  atorvastatin 80 milliGRAM(s) Oral at bedtime  levothyroxine 50 MICROGram(s) Oral daily  metoprolol tartrate 25 milliGRAM(s) Oral two times a day  ticagrelor 90 milliGRAM(s) Oral two times a day    MEDICATIONS  (PRN):  acetaminophen   Tablet .. 650 milliGRAM(s) Oral every 6 hours PRN Moderate Pain (4 - 6)      ALLERGIES:  No Known Allergies  Norvasc (Swelling)      LABS:                                       14.2   11.4  )-----------( 224      ( 13 Mar 2019 06:03 )             41.6   03-13    144  |  108  |  15  ----------------------------<  120<H>  3.9   |  23  |  0.78    Ca    9.2      13 Mar 2019 06:03  Mg     2.1     03-12    < from: Transthoracic Echocardiogram (03.12.19 @ 20:02) >  -----------------------------  Conclusions:  1. Thickened mitral valve.  Mild mitral regurgitation.  2. Normal trileaflet aortic valve. No aortic valve  regurgitation seen.  3. Mild concentric left ventricular hypertrophy.  4. Normal left ventricular systolic function. No segmental  wall motion abnormalities.  5. Mild diastolic dysfunction (Stage I).  6. Normal right ventricular size and function.  7. Normal pericardium with no pericardial effusion.  *** No previous Echo exam.  -----------------------------------------------------------    < end of copied text >      < from: Cardiac Cath Lab - Adult (03.12.19 @ 14:42) >  CORONARY VESSELS: The coronary circulation is right dominant.  LM:   --  LM: Angiography showed minor luminal irregularities with no flow  limiting lesions.  LAD:   --  LAD:Angiography showed minor luminal irregularities with no  flow limiting lesions.  CX:   --  Proximal circumflex: There was a 95 % stenosis.  RI:   --  Proximal ramus intermedius: There was a 90 % stenosis.  RCA:   --  RCA: Angiography showed minor luminal irregularities with no  flow limiting lesions.  COMPLICATIONS: There were no complications.  DIAGNOSTIC RECOMMENDATIONS: ASA and Plavix for 1 year.  INTERVENTIONAL RECOMMENDATIONS: ASA and Plavix for 1 year.    < end of copied text >

## 2019-03-13 NOTE — DISCHARGE NOTE PROVIDER - NSDCCPTREATMENT_GEN_ALL_CORE_FT
PRINCIPAL PROCEDURE  Procedure: PCI, with stent insertion  Findings and Treatment: 3/12/19 pCX 95% AMRIT x 1  3/13/19 ramus 99% AMRIT x 1

## 2019-03-13 NOTE — DISCHARGE NOTE PROVIDER - NSDCCPCAREPLAN_GEN_ALL_CORE_FT
PRINCIPAL DISCHARGE DIAGNOSIS  Problem: NSTEMI (non-ST elevated myocardial infarction)  Assessment and Plan of Treatment:       SECONDARY DISCHARGE DIAGNOSES  Problem: Benign essential HTN  Assessment and Plan of Treatment:     Problem: Hypothyroidism  Assessment and Plan of Treatment:

## 2019-03-13 NOTE — DISCHARGE NOTE PROVIDER - NSDCCPGOAL_GEN_ALL_CORE_FT
To get better and follow your care plan as instructed.   You will be free of chest pain or shortness of breath. Continue your medications. Do not stop Aspirin or Plavix unless instructed by your cardiologist.  No heavy lifting or pushing/pulling or strenuous activity with procedure arm for 2 weeks. No driving for 2 days. No sex for 1 week.  You may shower 24 hours following procedure but no soaking of your wrist in water (such as in a pool, sink, or tub) for 1 week. Check wrist site for bleeding and/or swelling daily following procedure. Call your doctor/cardiologist immediately for bleeding or swelling or if you have increased/persistent pain or drainage at the wrist site or if you have numbness, tingling or blue or white coloring of your hand or fingers.  Follow up with your cardiologist in 1- 2 weeks. You may call [facility] at [phone] or [phone] after office hours and weekends with any questions or concerns following your procedure. Do not stop your Asprin or Brilinta unless instructed to do so by your cardiologist.

## 2019-03-13 NOTE — PROGRESS NOTE ADULT - ASSESSMENT
79 yo female h/o htn, a/w chest pain and NSTEMI    cards f/u  s/p cath with pci. for staged pci today  echo noted  asa/plavix/statin  cards f/u    diarrhea  resolved    htn  resume home meds  monitor    dvt ppx

## 2019-03-13 NOTE — DISCHARGE NOTE PROVIDER - HOSPITAL COURSE
Patient is 79yo  female, Jewish with PMHx HTN (non complaint with her BP meds Norvasc 5mg (reports remote LE swelling), losartan/HCTZ 100mg-12.5mg), hypothyroidism who presented to [facility] on 3/10 with complaints of 7/10 CP for several days accompanied with dizziness and arm pain.  in ED hypertensive to 190/106, 220/104, Trop I 2.470 , CKMB 8.3-->6.3 , pro BNP 4227,, Chol 206, triglyceride 148 WBC 13.48 H/H14/43.5, trace LE in urinalysis, TSH WNL.  BP loaded with ASA and Plavix 600mg, started on heparin gtt.  Pt transferred to [facility] for further ischemic evaluation.  Pt denied dizziness, diaphoresis, palpitations, nausea, vomiting, peripheral edema, recent weight gain, or syncope.  Pt reports 2 episodes of diarrhea after potassium supplementation today. 3/12/19 pt underwent LHC revealing pCX 95% s/p AMRIT x 1. 3/13/19 pt underwent staged intervention of ramus 99% AMRIT x 1. Both interventions via right radial artery access. Access site without bleeding, hematoma or pain. Pt stable for discharge on Aspirin and Brilinta.

## 2019-03-13 NOTE — DISCHARGE NOTE PROVIDER - NSDCACTIVITY_GEN_ALL_CORE
No heavy lifting/straining/Showering allowed/Stairs allowed/Walking - Indoors allowed/Walking - Outdoors allowed

## 2019-03-13 NOTE — PROGRESS NOTE ADULT - SUBJECTIVE AND OBJECTIVE BOX
Patient is a 78y old  Female who presents with  NSTEMI (12 Mar 2019 14:28) now s/p C AMRIT x 1 OM1 via right radial access.           Allergies    No Known Allergies    Intolerances    Norvasc (Swelling)      Medications:  aspirin enteric coated 81 milliGRAM(s) Oral daily  atorvastatin 80 milliGRAM(s) Oral at bedtime  levothyroxine 50 MICROGram(s) Oral daily  metoprolol tartrate 25 milliGRAM(s) Oral two times a day  ticagrelor 90 milliGRAM(s) Oral two times a day      Vitals:  T(C): 36.8 (03-12-19 @ 20:10), Max: 37 (03-12-19 @ 11:22)  HR: 76 (03-12-19 @ 21:10) (65 - 82)  BP: 138/75 (03-12-19 @ 21:10) (138/75 - 179/74)  BP(mean): --  RR: 16 (03-12-19 @ 21:10) (16 - 16)  SpO2: 95% (03-12-19 @ 21:10) (94% - 97%)  Wt(kg): --  Daily     Daily   I&O's Summary    11 Mar 2019 07:01  -  12 Mar 2019 07:00  --------------------------------------------------------  IN: 200 mL / OUT: 250 mL / NET: -50 mL    12 Mar 2019 07:01  -  13 Mar 2019 03:48  --------------------------------------------------------  IN: 240 mL / OUT: 0 mL / NET: 240 mL          Physical Exam:  Appearance: Normal  Procedural Access Site: Right radial access. No hematoma, Non-tender to palpation, 2+ pulse, No bruit, No Ecchymosis  Neurologic: Non-focal  Psychiatry: AAOx3, Mood & affect appropriate  Skin: No rashes, No ecchymoses, No cyanosis    03-12    146<H>  |  111<H>  |  8   ----------------------------<  111<H>  4.4   |  23  |  0.68    Ca    8.9      12 Mar 2019 06:41  Mg     2.1     03-12    TPro  5.8<L>  /  Alb  3.4  /  TBili  0.5  /  DBili  x   /  AST  18  /  ALT  12  /  AlkPhos  93  03-11    PT/INR - ( 11 Mar 2019 16:32 )   PT: 12.7 sec;   INR: 1.11 ratio         PTT - ( 11 Mar 2019 16:32 )  PTT:74.2 sec  CARDIAC MARKERS ( 12 Mar 2019 06:41 )  x     / x     / 239 U/L / x     / x          Interpretation of Telemetry:

## 2019-03-14 ENCOUNTER — TRANSCRIPTION ENCOUNTER (OUTPATIENT)
Age: 79
End: 2019-03-14

## 2019-03-14 VITALS
OXYGEN SATURATION: 98 % | HEART RATE: 68 BPM | DIASTOLIC BLOOD PRESSURE: 78 MMHG | SYSTOLIC BLOOD PRESSURE: 137 MMHG | RESPIRATION RATE: 16 BRPM | TEMPERATURE: 98 F

## 2019-03-14 LAB
ANION GAP SERPL CALC-SCNC: 13 MMOL/L — SIGNIFICANT CHANGE UP (ref 5–17)
BASOPHILS # BLD AUTO: 0.1 K/UL — SIGNIFICANT CHANGE UP (ref 0–0.2)
BASOPHILS NFR BLD AUTO: 1.1 % — SIGNIFICANT CHANGE UP (ref 0–2)
BUN SERPL-MCNC: 17 MG/DL — SIGNIFICANT CHANGE UP (ref 7–23)
CALCIUM SERPL-MCNC: 9 MG/DL — SIGNIFICANT CHANGE UP (ref 8.4–10.5)
CHLORIDE SERPL-SCNC: 108 MMOL/L — SIGNIFICANT CHANGE UP (ref 96–108)
CO2 SERPL-SCNC: 22 MMOL/L — SIGNIFICANT CHANGE UP (ref 22–31)
CREAT SERPL-MCNC: 0.77 MG/DL — SIGNIFICANT CHANGE UP (ref 0.5–1.3)
EOSINOPHIL # BLD AUTO: 0.5 K/UL — SIGNIFICANT CHANGE UP (ref 0–0.5)
EOSINOPHIL NFR BLD AUTO: 4 % — SIGNIFICANT CHANGE UP (ref 0–6)
GLUCOSE SERPL-MCNC: 119 MG/DL — HIGH (ref 70–99)
HCT VFR BLD CALC: 39.6 % — SIGNIFICANT CHANGE UP (ref 34.5–45)
HGB BLD-MCNC: 13.9 G/DL — SIGNIFICANT CHANGE UP (ref 11.5–15.5)
LYMPHOCYTES # BLD AUTO: 3.8 K/UL — HIGH (ref 1–3.3)
LYMPHOCYTES # BLD AUTO: 32.7 % — SIGNIFICANT CHANGE UP (ref 13–44)
MCHC RBC-ENTMCNC: 31.4 PG — SIGNIFICANT CHANGE UP (ref 27–34)
MCHC RBC-ENTMCNC: 35.2 GM/DL — SIGNIFICANT CHANGE UP (ref 32–36)
MCV RBC AUTO: 89.2 FL — SIGNIFICANT CHANGE UP (ref 80–100)
MONOCYTES # BLD AUTO: 1 K/UL — HIGH (ref 0–0.9)
MONOCYTES NFR BLD AUTO: 8.3 % — SIGNIFICANT CHANGE UP (ref 2–14)
NEUTROPHILS # BLD AUTO: 6.3 K/UL — SIGNIFICANT CHANGE UP (ref 1.8–7.4)
NEUTROPHILS NFR BLD AUTO: 54 % — SIGNIFICANT CHANGE UP (ref 43–77)
PLATELET # BLD AUTO: 241 K/UL — SIGNIFICANT CHANGE UP (ref 150–400)
POTASSIUM SERPL-MCNC: 3.9 MMOL/L — SIGNIFICANT CHANGE UP (ref 3.5–5.3)
POTASSIUM SERPL-SCNC: 3.9 MMOL/L — SIGNIFICANT CHANGE UP (ref 3.5–5.3)
RBC # BLD: 4.44 M/UL — SIGNIFICANT CHANGE UP (ref 3.8–5.2)
RBC # FLD: 12.2 % — SIGNIFICANT CHANGE UP (ref 10.3–14.5)
SODIUM SERPL-SCNC: 143 MMOL/L — SIGNIFICANT CHANGE UP (ref 135–145)
WBC # BLD: 11.7 K/UL — HIGH (ref 3.8–10.5)
WBC # FLD AUTO: 11.7 K/UL — HIGH (ref 3.8–10.5)

## 2019-03-14 PROCEDURE — 93306 TTE W/DOPPLER COMPLETE: CPT

## 2019-03-14 PROCEDURE — 93458 L HRT ARTERY/VENTRICLE ANGIO: CPT | Mod: 59

## 2019-03-14 PROCEDURE — 87324 CLOSTRIDIUM AG IA: CPT

## 2019-03-14 PROCEDURE — 82550 ASSAY OF CK (CPK): CPT

## 2019-03-14 PROCEDURE — 99153 MOD SED SAME PHYS/QHP EA: CPT

## 2019-03-14 PROCEDURE — 80061 LIPID PANEL: CPT

## 2019-03-14 PROCEDURE — 85730 THROMBOPLASTIN TIME PARTIAL: CPT

## 2019-03-14 PROCEDURE — 84484 ASSAY OF TROPONIN QUANT: CPT

## 2019-03-14 PROCEDURE — 80053 COMPREHEN METABOLIC PANEL: CPT

## 2019-03-14 PROCEDURE — C9600: CPT | Mod: RI

## 2019-03-14 PROCEDURE — 93005 ELECTROCARDIOGRAM TRACING: CPT

## 2019-03-14 PROCEDURE — 80048 BASIC METABOLIC PNL TOTAL CA: CPT

## 2019-03-14 PROCEDURE — 85027 COMPLETE CBC AUTOMATED: CPT

## 2019-03-14 PROCEDURE — 83735 ASSAY OF MAGNESIUM: CPT

## 2019-03-14 PROCEDURE — 87449 NOS EACH ORGANISM AG IA: CPT

## 2019-03-14 PROCEDURE — 99152 MOD SED SAME PHYS/QHP 5/>YRS: CPT

## 2019-03-14 PROCEDURE — 85610 PROTHROMBIN TIME: CPT

## 2019-03-14 PROCEDURE — C1887: CPT

## 2019-03-14 PROCEDURE — C1874: CPT

## 2019-03-14 PROCEDURE — C1894: CPT

## 2019-03-14 PROCEDURE — C1725: CPT

## 2019-03-14 PROCEDURE — C1769: CPT

## 2019-03-14 PROCEDURE — C9606: CPT | Mod: LC

## 2019-03-14 RX ORDER — TICAGRELOR 90 MG/1
1 TABLET ORAL
Qty: 180 | Refills: 3 | OUTPATIENT
Start: 2019-03-14 | End: 2020-03-07

## 2019-03-14 RX ORDER — METOPROLOL TARTRATE 50 MG
1 TABLET ORAL
Qty: 60 | Refills: 0 | OUTPATIENT
Start: 2019-03-14

## 2019-03-14 RX ORDER — TICAGRELOR 90 MG/1
1 TABLET ORAL
Qty: 60 | Refills: 9 | OUTPATIENT
Start: 2019-03-14 | End: 2020-01-07

## 2019-03-14 RX ADMIN — TICAGRELOR 90 MILLIGRAM(S): 90 TABLET ORAL at 06:19

## 2019-03-14 RX ADMIN — Medication 25 MILLIGRAM(S): at 06:19

## 2019-03-14 RX ADMIN — Medication 50 MICROGRAM(S): at 06:19

## 2019-03-14 RX ADMIN — Medication 81 MILLIGRAM(S): at 06:19

## 2019-03-14 NOTE — PROGRESS NOTE ADULT - ASSESSMENT
79 yo female h/o htn, a/w chest pain and NSTEMI    cards f/u  s/p cath with pci. for staged pci today  echo noted  asa/plavix/statin  cards f/u    diarrhea  resolved    htn  resume home meds  monitor    dvt ppx 79 yo female h/o htn, a/w chest pain and NSTEMI    cards f/u  s/p cath with  staged pci   echo noted  asa/ brillianta as per card   statin  cards f/u    diarrhea  resolved    htn  resume home meds  monitor    dvt ppx     dc planning with outpt cards f/u

## 2019-03-14 NOTE — DISCHARGE NOTE NURSING/CASE MANAGEMENT/SOCIAL WORK - NSDCDPATPORTLINK_GEN_ALL_CORE
You can access the [facility] Patient Portal, offered by [facility], by registering with the following website: [email or web address]

## 2019-03-14 NOTE — PROGRESS NOTE ADULT - SUBJECTIVE AND OBJECTIVE BOX
DOMITILA MICHELLE  78y Female  MRN:[id number]    Patient is a 78y old  Female who presents with a chief complaint of NSTEMI  HPI:  Patient is 77yo  female, Mu-ism with PMHx HTN (non complaint with her BP meds Norvasc 5mg (reports remote LE swelling), losartan/HCTZ 100mg-12.5mg), hypothyroidism who presented to [facility] on 3/10 with complaints of 7/10 CP for several days accompanied with dizziness and arm pain.  in ED hypertensive to 190/106, 220/104, Trop I 2.470 , CKMB 8.3-->6.3 , pro BNP 4227,, Chol 206, triglyceride 148 WBC 13.48 H/H14/43.5, trace LE in urinalysis, TSH WNL.  BP loaded with ASA and Plavix 600mg, started on heparin gtt.  Pt transferred to [facility] for further ischemic evaluation.  Pt denied dizziness, diaphoresis, palpitations, nausea, vomiting, peripheral edema, recent weight gain, or syncope.  Pt reports 2 episodes of diarrhea after potassium supplementation today. (11 Mar 2019 14:36)      Patient seen and evaluated at bedside. No acute events overnight except as noted.    Interval HPI: s/p cath with staged pci    PAST MEDICAL & SURGICAL HISTORY:  Hypothyroid  HTN (hypertension)  History of cholecystectomy      REVIEW OF SYSTEMS:  as per hpi    VITALS:  Vital Signs Last 24 Hrs  T(C): 36.7 (14 Mar 2019 14:39), Max: 36.7 (13 Mar 2019 21:00)  T(F): 98 (14 Mar 2019 14:39), Max: 98.1 (13 Mar 2019 21:00)  HR: 68 (14 Mar 2019 14:39) (62 - 74)  BP: 137/78 (14 Mar 2019 14:39) (115/71 - 166/91)  BP(mean): --  RR: 16 (14 Mar 2019 14:39) (16 - 16)  SpO2: 98% (14 Mar 2019 14:39) (94% - 98%)      PHYSICAL EXAM:  GENERAL: NAD, well-developed  HEAD:  Atraumatic, Normocephalic  EYES: EOMI, PERRLA, conjunctiva and sclera clear  NECK: Supple, No JVD  CHEST/LUNG: Clear to auscultation bilaterally; No wheeze  HEART: S1, S2; No murmurs, rubs, or gallops  ABDOMEN: Soft, Nontender, Nondistended; Bowel sounds present  EXTREMITIES:  2+ Peripheral Pulses, No clubbing, cyanosis, or edema  PSYCH: Normal affect  NEUROLOGY: AAOX3; non-focal  SKIN: No rashes or lesions    Consultant(s) Notes Reviewed:  [x ] YES  [ ] NO  Care Discussed with Consultants/Other Providers [ x] YES  [ ] NO    MEDS:  MEDICATIONS  (STANDING):  aspirin enteric coated 81 milliGRAM(s) Oral daily  atorvastatin 80 milliGRAM(s) Oral at bedtime  levothyroxine 50 MICROGram(s) Oral daily  metoprolol tartrate 25 milliGRAM(s) Oral two times a day  ticagrelor 90 milliGRAM(s) Oral two times a day    MEDICATIONS  (PRN):  acetaminophen   Tablet .. 650 milliGRAM(s) Oral every 6 hours PRN Moderate Pain (4 - 6)      ALLERGIES:  No Known Allergies  Norvasc (Swelling)      LABS:                                       14.2   11.4  )-----------( 224      ( 13 Mar 2019 06:03 )             41.6   03-13    144  |  108  |  15  ----------------------------<  120<H>  3.9   |  23  |  0.78    Ca    9.2      13 Mar 2019 06:03  Mg     2.1     03-12    < from: Transthoracic Echocardiogram (03.12.19 @ 20:02) >  -----------------------------  Conclusions:  1. Thickened mitral valve.  Mild mitral regurgitation.  2. Normal trileaflet aortic valve. No aortic valve  regurgitation seen.  3. Mild concentric left ventricular hypertrophy.  4. Normal left ventricular systolic function. No segmental  wall motion abnormalities.  5. Mild diastolic dysfunction (Stage I).  6. Normal right ventricular size and function.  7. Normal pericardium with no pericardial effusion.  *** No previous Echo exam.  -----------------------------------------------------------    < end of copied text >      < from: Cardiac Cath Lab - Adult (03.12.19 @ 14:42) >  CORONARY VESSELS: The coronary circulation is right dominant.  LM:   --  LM: Angiography showed minor luminal irregularities with no flow  limiting lesions.  LAD:   --  LAD:Angiography showed minor luminal irregularities with no  flow limiting lesions.  CX:   --  Proximal circumflex: There was a 95 % stenosis.  RI:   --  Proximal ramus intermedius: There was a 90 % stenosis.  RCA:   --  RCA: Angiography showed minor luminal irregularities with no  flow limiting lesions.  COMPLICATIONS: There were no complications.  DIAGNOSTIC RECOMMENDATIONS: ASA and Plavix for 1 year.  INTERVENTIONAL RECOMMENDATIONS: ASA and Plavix for 1 year.    < end of copied text > DOMITILA MICHELLE  78y Female  MRN:[id number]    Patient is a 78y old  Female who presents with a chief complaint of NSTEMI  HPI:  Patient is 79yo  female, Catholic with PMHx HTN (non complaint with her BP meds Norvasc 5mg (reports remote LE swelling), losartan/HCTZ 100mg-12.5mg), hypothyroidism who presented to [facility] on 3/10 with complaints of 7/10 CP for several days accompanied with dizziness and arm pain.  in ED hypertensive to 190/106, 220/104, Trop I 2.470 , CKMB 8.3-->6.3 , pro BNP 4227,, Chol 206, triglyceride 148 WBC 13.48 H/H14/43.5, trace LE in urinalysis, TSH WNL.  BP loaded with ASA and Plavix 600mg, started on heparin gtt.  Pt transferred to [facility] for further ischemic evaluation.  Pt denied dizziness, diaphoresis, palpitations, nausea, vomiting, peripheral edema, recent weight gain, or syncope.  Pt reports 2 episodes of diarrhea after potassium supplementation today. (11 Mar 2019 14:36)      Patient seen and evaluated at bedside. No acute events overnight except as noted.    Interval HPI: s/p cath with staged pci    PAST MEDICAL & SURGICAL HISTORY:  Hypothyroid  HTN (hypertension)  History of cholecystectomy      REVIEW OF SYSTEMS:  as per hpi    VITALS:  Vital Signs Last 24 Hrs  T(C): 36.7 (14 Mar 2019 14:39), Max: 36.7 (13 Mar 2019 21:00)  T(F): 98 (14 Mar 2019 14:39), Max: 98.1 (13 Mar 2019 21:00)  HR: 68 (14 Mar 2019 14:39) (62 - 74)  BP: 137/78 (14 Mar 2019 14:39) (115/71 - 166/91)  BP(mean): --  RR: 16 (14 Mar 2019 14:39) (16 - 16)  SpO2: 98% (14 Mar 2019 14:39) (94% - 98%)      PHYSICAL EXAM:  GENERAL: NAD, well-developed  HEAD:  Atraumatic, Normocephalic  EYES: EOMI, PERRLA, conjunctiva and sclera clear  NECK: Supple, No JVD  CHEST/LUNG: Clear to auscultation bilaterally; No wheeze  HEART: S1, S2; No murmurs, rubs, or gallops  ABDOMEN: Soft, Nontender, Nondistended; Bowel sounds present  EXTREMITIES:  2+ Peripheral Pulses, No clubbing, cyanosis, or edema  PSYCH: Normal affect  NEUROLOGY: AAOX3; non-focal  SKIN: No rashes or lesions    Consultant(s) Notes Reviewed:  [x ] YES  [ ] NO  Care Discussed with Consultants/Other Providers [ x] YES  [ ] NO    MEDS:  MEDICATIONS  (STANDING):  aspirin enteric coated 81 milliGRAM(s) Oral daily  atorvastatin 80 milliGRAM(s) Oral at bedtime  levothyroxine 50 MICROGram(s) Oral daily  metoprolol tartrate 25 milliGRAM(s) Oral two times a day  ticagrelor 90 milliGRAM(s) Oral two times a day    MEDICATIONS  (PRN):  acetaminophen   Tablet .. 650 milliGRAM(s) Oral every 6 hours PRN Moderate Pain (4 - 6)        ALLERGIES:  No Known Allergies  Norvasc (Swelling)      LABS:                                      13.9   11.7  )-----------( 241      ( 14 Mar 2019 06:26 )             39.6   03-14    143  |  108  |  17  ----------------------------<  119<H>  3.9   |  22  |  0.77    Ca    9.0      14 Mar 2019 06:26        < from: Transthoracic Echocardiogram (03.12.19 @ 20:02) >  -----------------------------  Conclusions:  1. Thickened mitral valve.  Mild mitral regurgitation.  2. Normal trileaflet aortic valve. No aortic valve  regurgitation seen.  3. Mild concentric left ventricular hypertrophy.  4. Normal left ventricular systolic function. No segmental  wall motion abnormalities.  5. Mild diastolic dysfunction (Stage I).  6. Normal right ventricular size and function.  7. Normal pericardium with no pericardial effusion.  *** No previous Echo exam.  -----------------------------------------------------------    < end of copied text >      < from: Cardiac Cath Lab - Adult (03.12.19 @ 14:42) >  CORONARY VESSELS: The coronary circulation is right dominant.  LM:   --  LM: Angiography showed minor luminal irregularities with no flow  limiting lesions.  LAD:   --  LAD:Angiography showed minor luminal irregularities with no  flow limiting lesions.  CX:   --  Proximal circumflex: There was a 95 % stenosis.  RI:   --  Proximal ramus intermedius: There was a 90 % stenosis.  RCA:   --  RCA: Angiography showed minor luminal irregularities with no  flow limiting lesions.  COMPLICATIONS: There were no complications.  DIAGNOSTIC RECOMMENDATIONS: ASA and Plavix for 1 year.  INTERVENTIONAL RECOMMENDATIONS: ASA and Plavix for 1 year.    < end of copied text >

## 2020-03-30 NOTE — PATIENT PROFILE ADULT - IS PATIENT POST-MENOPAUSAL?
Quality 130: Documentation Of Current Medications In The Medical Record: Current Medications Documented
Quality 226: Preventive Care And Screening: Tobacco Use: Screening And Cessation Intervention: Patient screened for tobacco use and is an ex/non-smoker
Detail Level: Generalized
yes

## 2020-10-09 NOTE — H&P CARDIOLOGY - TIME:
Final Anesthesia Post-op Assessment    Patient: David Andrews  Procedure(s) Performed: EGD, WITH ABLATION  Anesthesia type: MAC    Vitals Value Taken Time   Temp >36 10/09/20 1029   Pulse 98 10/09/20 1029   Resp 18 10/09/20 1029   SpO2 100 10/09/20 1029   /85 10/09/20 1029         Patient Location: bedside  Post-op Vital Signs:stable  Level of Consciousness: participates in exam, answers questions appropriately, awake, alert and oriented  Respiratory Status: spontaneous ventilation and face mask  Cardiovascular blood pressure returned to baseline and stable  Hydration: euvolemic  Pain Management: well controlled  Handoff: Handoff to receiving nurse was performed and questions were answered Nausea: None  Airway Patency:patent  Post-op Assessment: awake, alert, appropriately conversant, or baseline, no complications and patient tolerated procedure well with no complications      No complications documented.   
14:20
14:36

## 2025-08-01 ENCOUNTER — INPATIENT (INPATIENT)
Facility: HOSPITAL | Age: 85
LOS: 0 days | Discharge: HOME | DRG: 242 | End: 2025-08-02
Attending: HOSPITALIST | Admitting: INTERNAL MEDICINE
Payer: MEDICARE

## 2025-08-01 ENCOUNTER — RESULT REVIEW (OUTPATIENT)
Age: 85
End: 2025-08-01

## 2025-08-01 VITALS — HEART RATE: 25 BPM | OXYGEN SATURATION: 96 % | RESPIRATION RATE: 18 BRPM

## 2025-08-01 DIAGNOSIS — R00.1 BRADYCARDIA, UNSPECIFIED: ICD-10-CM

## 2025-08-01 DIAGNOSIS — Z90.49 ACQUIRED ABSENCE OF OTHER SPECIFIED PARTS OF DIGESTIVE TRACT: Chronic | ICD-10-CM

## 2025-08-01 DIAGNOSIS — I44.2 ATRIOVENTRICULAR BLOCK, COMPLETE: ICD-10-CM

## 2025-08-01 PROBLEM — I10 ESSENTIAL (PRIMARY) HYPERTENSION: Chronic | Status: ACTIVE | Noted: 2019-03-11

## 2025-08-01 PROBLEM — E03.9 HYPOTHYROIDISM, UNSPECIFIED: Chronic | Status: ACTIVE | Noted: 2019-03-11

## 2025-08-01 LAB
ALBUMIN SERPL ELPH-MCNC: 3.8 G/DL — SIGNIFICANT CHANGE UP (ref 3.3–5.2)
ALP SERPL-CCNC: 83 U/L — SIGNIFICANT CHANGE UP (ref 40–120)
ALT FLD-CCNC: 18 U/L — SIGNIFICANT CHANGE UP
ANION GAP SERPL CALC-SCNC: 16 MMOL/L — SIGNIFICANT CHANGE UP (ref 5–17)
ANISOCYTOSIS BLD QL: SLIGHT — SIGNIFICANT CHANGE UP
APTT BLD: 25 SEC — LOW (ref 26.1–36.8)
AST SERPL-CCNC: 28 U/L — SIGNIFICANT CHANGE UP
BASOPHILS # BLD AUTO: 0.08 K/UL — SIGNIFICANT CHANGE UP (ref 0–0.2)
BASOPHILS # BLD MANUAL: 0 K/UL — SIGNIFICANT CHANGE UP (ref 0–0.2)
BASOPHILS NFR BLD AUTO: 0.4 % — SIGNIFICANT CHANGE UP (ref 0–2)
BASOPHILS NFR BLD MANUAL: 0 % — SIGNIFICANT CHANGE UP (ref 0–2)
BILIRUB SERPL-MCNC: 0.4 MG/DL — SIGNIFICANT CHANGE UP (ref 0.4–2)
BLASTS # FLD: 1 % — HIGH (ref 0–0)
BLASTS NFR BLD: 1 % — HIGH (ref 0–0)
BLD GP AB SCN SERPL QL: SIGNIFICANT CHANGE UP
BUN SERPL-MCNC: 20.1 MG/DL — HIGH (ref 8–20)
CALCIUM SERPL-MCNC: 8.8 MG/DL — SIGNIFICANT CHANGE UP (ref 8.4–10.5)
CHLORIDE SERPL-SCNC: 109 MMOL/L — HIGH (ref 96–108)
CO2 SERPL-SCNC: 19 MMOL/L — LOW (ref 22–29)
CREAT SERPL-MCNC: 0.67 MG/DL — SIGNIFICANT CHANGE UP (ref 0.5–1.3)
EGFR: 87 ML/MIN/1.73M2 — SIGNIFICANT CHANGE UP
EGFR: 87 ML/MIN/1.73M2 — SIGNIFICANT CHANGE UP
ELLIPTOCYTES BLD QL SMEAR: SLIGHT — SIGNIFICANT CHANGE UP
EOSINOPHIL # BLD AUTO: 0.5 K/UL — SIGNIFICANT CHANGE UP (ref 0–0.5)
EOSINOPHIL # BLD MANUAL: 1.14 K/UL — HIGH (ref 0–0.5)
EOSINOPHIL NFR BLD AUTO: 2.4 % — SIGNIFICANT CHANGE UP (ref 0–6)
EOSINOPHIL NFR BLD MANUAL: 5.4 % — SIGNIFICANT CHANGE UP (ref 0–6)
GIANT PLATELETS BLD QL SMEAR: PRESENT
GLUCOSE BLDC GLUCOMTR-MCNC: 93 MG/DL — SIGNIFICANT CHANGE UP (ref 70–99)
GLUCOSE SERPL-MCNC: 140 MG/DL — HIGH (ref 70–99)
HCT VFR BLD CALC: 39.6 % — SIGNIFICANT CHANGE UP (ref 34.5–45)
HCT VFR BLD CALC: 40.3 % — SIGNIFICANT CHANGE UP (ref 34.5–45)
HGB BLD-MCNC: 12.9 G/DL — SIGNIFICANT CHANGE UP (ref 11.5–15.5)
HGB BLD-MCNC: 13.1 G/DL — SIGNIFICANT CHANGE UP (ref 11.5–15.5)
IMM GRANULOCYTES # BLD AUTO: 0.02 K/UL — SIGNIFICANT CHANGE UP (ref 0–0.07)
IMM GRANULOCYTES NFR BLD AUTO: 0.1 % — SIGNIFICANT CHANGE UP (ref 0–0.9)
INR BLD: 0.97 RATIO — SIGNIFICANT CHANGE UP (ref 0.85–1.16)
LACTATE BLDV-MCNC: 1.4 MMOL/L — SIGNIFICANT CHANGE UP (ref 0.5–2)
LYMPHOCYTES # BLD AUTO: 16.96 K/UL — HIGH (ref 1–3.3)
LYMPHOCYTES # BLD MANUAL: 13.84 K/UL — HIGH (ref 1–3.3)
LYMPHOCYTES NFR BLD AUTO: 80 % — HIGH (ref 13–44)
LYMPHOCYTES NFR BLD MANUAL: 65.3 % — HIGH (ref 13–44)
MACROCYTES BLD QL: SLIGHT — SIGNIFICANT CHANGE UP
MAGNESIUM SERPL-MCNC: 2.3 MG/DL — SIGNIFICANT CHANGE UP (ref 1.6–2.6)
MANUAL BLAST #: 0.21 K/UL — HIGH (ref 0–0)
MANUAL SMEAR VERIFICATION: SIGNIFICANT CHANGE UP
MCHC RBC-ENTMCNC: 29.9 PG — SIGNIFICANT CHANGE UP (ref 27–34)
MCHC RBC-ENTMCNC: 30 PG — SIGNIFICANT CHANGE UP (ref 27–34)
MCHC RBC-ENTMCNC: 32.5 G/DL — SIGNIFICANT CHANGE UP (ref 32–36)
MCHC RBC-ENTMCNC: 32.6 G/DL — SIGNIFICANT CHANGE UP (ref 32–36)
MCV RBC AUTO: 91.9 FL — SIGNIFICANT CHANGE UP (ref 80–100)
MCV RBC AUTO: 92.2 FL — SIGNIFICANT CHANGE UP (ref 80–100)
MICROCYTES BLD QL: SLIGHT — SIGNIFICANT CHANGE UP
MONOCYTES # BLD AUTO: 0.81 K/UL — SIGNIFICANT CHANGE UP (ref 0–0.9)
MONOCYTES # BLD MANUAL: 0.72 K/UL — SIGNIFICANT CHANGE UP (ref 0–0.9)
MONOCYTES NFR BLD AUTO: 3.8 % — SIGNIFICANT CHANGE UP (ref 2–14)
MONOCYTES NFR BLD MANUAL: 3.4 % — SIGNIFICANT CHANGE UP (ref 2–14)
MRSA PCR RESULT.: SIGNIFICANT CHANGE UP
NEUTROPHILS # BLD AUTO: 2.82 K/UL — SIGNIFICANT CHANGE UP (ref 1.8–7.4)
NEUTROPHILS # BLD MANUAL: 5.28 K/UL — SIGNIFICANT CHANGE UP (ref 1.8–7.4)
NEUTROPHILS NFR BLD AUTO: 13.3 % — LOW (ref 43–77)
NEUTROPHILS NFR BLD MANUAL: 24.9 % — LOW (ref 43–77)
NRBC # BLD AUTO: 0 K/UL — SIGNIFICANT CHANGE UP (ref 0–0)
NRBC # BLD AUTO: 0 K/UL — SIGNIFICANT CHANGE UP (ref 0–0)
NRBC # FLD: 0 K/UL — SIGNIFICANT CHANGE UP (ref 0–0)
NRBC # FLD: 0 K/UL — SIGNIFICANT CHANGE UP (ref 0–0)
NRBC BLD AUTO-RTO: 0 /100 WBCS — SIGNIFICANT CHANGE UP (ref 0–0)
NRBC BLD AUTO-RTO: 0 /100 WBCS — SIGNIFICANT CHANGE UP (ref 0–0)
OVALOCYTES BLD QL SMEAR: SLIGHT — SIGNIFICANT CHANGE UP
PLAT MORPH BLD: NORMAL — SIGNIFICANT CHANGE UP
PLATELET # BLD AUTO: 202 K/UL — SIGNIFICANT CHANGE UP (ref 150–400)
PLATELET # BLD AUTO: 237 K/UL — SIGNIFICANT CHANGE UP (ref 150–400)
PMV BLD: 10.1 FL — SIGNIFICANT CHANGE UP (ref 7–13)
PMV BLD: 10.6 FL — SIGNIFICANT CHANGE UP (ref 7–13)
POIKILOCYTOSIS BLD QL AUTO: SLIGHT — SIGNIFICANT CHANGE UP
POLYCHROMASIA BLD QL SMEAR: SLIGHT — SIGNIFICANT CHANGE UP
POTASSIUM SERPL-MCNC: 5 MMOL/L — SIGNIFICANT CHANGE UP (ref 3.5–5.3)
POTASSIUM SERPL-SCNC: 5 MMOL/L — SIGNIFICANT CHANGE UP (ref 3.5–5.3)
PROT SERPL-MCNC: 6.5 G/DL — LOW (ref 6.6–8.7)
PROTHROM AB SERPL-ACNC: 11.3 SEC — SIGNIFICANT CHANGE UP (ref 9.9–13.4)
RBC # BLD: 4.31 M/UL — SIGNIFICANT CHANGE UP (ref 3.8–5.2)
RBC # BLD: 4.37 M/UL — SIGNIFICANT CHANGE UP (ref 3.8–5.2)
RBC # FLD: 13.4 % — SIGNIFICANT CHANGE UP (ref 10.3–14.5)
RBC # FLD: 13.5 % — SIGNIFICANT CHANGE UP (ref 10.3–14.5)
RBC BLD AUTO: ABNORMAL
S AUREUS DNA NOSE QL NAA+PROBE: SIGNIFICANT CHANGE UP
SMUDGE CELLS # BLD: PRESENT
SODIUM SERPL-SCNC: 144 MMOL/L — SIGNIFICANT CHANGE UP (ref 135–145)
T3 SERPL-MCNC: 88 NG/DL — SIGNIFICANT CHANGE UP (ref 80–200)
T4 AB SER-ACNC: 7.1 UG/DL — SIGNIFICANT CHANGE UP (ref 4.5–12)
T4 FREE SERPL-MCNC: 1.1 NG/DL — SIGNIFICANT CHANGE UP (ref 0.9–1.8)
TROPONIN T, HIGH SENSITIVITY RESULT: 14 NG/L — SIGNIFICANT CHANGE UP (ref 0–51)
TSH SERPL-MCNC: 4.12 UIU/ML — SIGNIFICANT CHANGE UP (ref 0.27–4.2)
WBC # BLD: 21.19 K/UL — HIGH (ref 3.8–10.5)
WBC # BLD: 26.26 K/UL — HIGH (ref 3.8–10.5)
WBC # FLD AUTO: 21.19 K/UL — HIGH (ref 3.8–10.5)
WBC # FLD AUTO: 26.26 K/UL — HIGH (ref 3.8–10.5)

## 2025-08-01 PROCEDURE — 84484 ASSAY OF TROPONIN QUANT: CPT

## 2025-08-01 PROCEDURE — 99291 CRITICAL CARE FIRST HOUR: CPT

## 2025-08-01 PROCEDURE — 93306 TTE W/DOPPLER COMPLETE: CPT | Mod: 26

## 2025-08-01 PROCEDURE — 86901 BLOOD TYPING SEROLOGIC RH(D): CPT

## 2025-08-01 PROCEDURE — 86850 RBC ANTIBODY SCREEN: CPT

## 2025-08-01 PROCEDURE — 83605 ASSAY OF LACTIC ACID: CPT

## 2025-08-01 PROCEDURE — 85610 PROTHROMBIN TIME: CPT

## 2025-08-01 PROCEDURE — 85730 THROMBOPLASTIN TIME PARTIAL: CPT

## 2025-08-01 PROCEDURE — 71045 X-RAY EXAM CHEST 1 VIEW: CPT

## 2025-08-01 PROCEDURE — 99152 MOD SED SAME PHYS/QHP 5/>YRS: CPT

## 2025-08-01 PROCEDURE — 87640 STAPH A DNA AMP PROBE: CPT

## 2025-08-01 PROCEDURE — 33208 INSRT HEART PM ATRIAL & VENT: CPT | Mod: KX

## 2025-08-01 PROCEDURE — 84480 ASSAY TRIIODOTHYRONINE (T3): CPT

## 2025-08-01 PROCEDURE — 84439 ASSAY OF FREE THYROXINE: CPT

## 2025-08-01 PROCEDURE — 86900 BLOOD TYPING SEROLOGIC ABO: CPT

## 2025-08-01 PROCEDURE — 84443 ASSAY THYROID STIM HORMONE: CPT

## 2025-08-01 PROCEDURE — 93005 ELECTROCARDIOGRAM TRACING: CPT

## 2025-08-01 PROCEDURE — 99291 CRITICAL CARE FIRST HOUR: CPT | Mod: FS

## 2025-08-01 PROCEDURE — 85025 COMPLETE CBC W/AUTO DIFF WBC: CPT

## 2025-08-01 PROCEDURE — 93010 ELECTROCARDIOGRAM REPORT: CPT | Mod: 76

## 2025-08-01 PROCEDURE — 83735 ASSAY OF MAGNESIUM: CPT

## 2025-08-01 PROCEDURE — 71045 X-RAY EXAM CHEST 1 VIEW: CPT | Mod: 26

## 2025-08-01 PROCEDURE — 99222 1ST HOSP IP/OBS MODERATE 55: CPT | Mod: GC

## 2025-08-01 PROCEDURE — 87040 BLOOD CULTURE FOR BACTERIA: CPT

## 2025-08-01 PROCEDURE — 80053 COMPREHEN METABOLIC PANEL: CPT

## 2025-08-01 PROCEDURE — 87641 MR-STAPH DNA AMP PROBE: CPT

## 2025-08-01 PROCEDURE — 84436 ASSAY OF TOTAL THYROXINE: CPT

## 2025-08-01 PROCEDURE — 36415 COLL VENOUS BLD VENIPUNCTURE: CPT

## 2025-08-01 RX ORDER — CLOPIDOGREL BISULFATE 75 MG/1
1 TABLET, FILM COATED ORAL
Refills: 0 | DISCHARGE

## 2025-08-01 RX ORDER — LEVOTHYROXINE SODIUM 300 MCG
50 TABLET ORAL DAILY
Refills: 0 | Status: DISCONTINUED | OUTPATIENT
Start: 2025-08-01 | End: 2025-08-02

## 2025-08-01 RX ORDER — LOSARTAN POTASSIUM 100 MG/1
1 TABLET, FILM COATED ORAL
Refills: 0 | DISCHARGE

## 2025-08-01 RX ORDER — MAGNESIUM, ALUMINUM HYDROXIDE 200-200 MG
30 TABLET,CHEWABLE ORAL EVERY 4 HOURS
Refills: 0 | Status: DISCONTINUED | OUTPATIENT
Start: 2025-08-01 | End: 2025-08-02

## 2025-08-01 RX ORDER — GLUCAGON 3 MG/1
4 POWDER NASAL ONCE
Refills: 0 | Status: COMPLETED | OUTPATIENT
Start: 2025-08-01 | End: 2025-08-01

## 2025-08-01 RX ORDER — NIFEDIPINE 30 MG
30 TABLET, EXTENDED RELEASE 24 HR ORAL DAILY
Refills: 0 | Status: DISCONTINUED | OUTPATIENT
Start: 2025-08-01 | End: 2025-08-02

## 2025-08-01 RX ORDER — LOSARTAN POTASSIUM 100 MG/1
100 TABLET, FILM COATED ORAL DAILY
Refills: 0 | Status: DISCONTINUED | OUTPATIENT
Start: 2025-08-01 | End: 2025-08-02

## 2025-08-01 RX ORDER — ONDANSETRON HCL/PF 4 MG/2 ML
4 VIAL (ML) INJECTION ONCE
Refills: 0 | Status: COMPLETED | OUTPATIENT
Start: 2025-08-01 | End: 2025-08-01

## 2025-08-01 RX ORDER — NIFEDIPINE 30 MG
1 TABLET, EXTENDED RELEASE 24 HR ORAL
Refills: 0 | DISCHARGE

## 2025-08-01 RX ORDER — MELATONIN 5 MG
3 TABLET ORAL AT BEDTIME
Refills: 0 | Status: DISCONTINUED | OUTPATIENT
Start: 2025-08-01 | End: 2025-08-02

## 2025-08-01 RX ORDER — CEFAZOLIN SODIUM IN 0.9 % NACL 3 G/100 ML
2000 INTRAVENOUS SOLUTION, PIGGYBACK (ML) INTRAVENOUS ONCE
Refills: 0 | Status: COMPLETED | OUTPATIENT
Start: 2025-08-01 | End: 2025-08-01

## 2025-08-01 RX ORDER — FENTANYL CITRATE-0.9 % NACL/PF 100MCG/2ML
25 SYRINGE (ML) INTRAVENOUS ONCE
Refills: 0 | Status: DISCONTINUED | OUTPATIENT
Start: 2025-08-01 | End: 2025-08-01

## 2025-08-01 RX ORDER — MECLIZINE HCL 12.5 MG
1 TABLET ORAL
Refills: 0 | DISCHARGE

## 2025-08-01 RX ORDER — CALCIUM CHLORIDE 100 MG/ML
1000 INJECTION, SOLUTION INTRAVENOUS ONCE
Refills: 0 | Status: COMPLETED | OUTPATIENT
Start: 2025-08-01 | End: 2025-08-01

## 2025-08-01 RX ORDER — ONDANSETRON HCL/PF 4 MG/2 ML
4 VIAL (ML) INJECTION EVERY 8 HOURS
Refills: 0 | Status: DISCONTINUED | OUTPATIENT
Start: 2025-08-01 | End: 2025-08-02

## 2025-08-01 RX ORDER — ACETAMINOPHEN 500 MG/5ML
650 LIQUID (ML) ORAL EVERY 6 HOURS
Refills: 0 | Status: DISCONTINUED | OUTPATIENT
Start: 2025-08-01 | End: 2025-08-02

## 2025-08-01 RX ADMIN — GLUCAGON 4 MILLIGRAM(S): 3 POWDER NASAL at 06:45

## 2025-08-01 RX ADMIN — Medication 1 APPLICATION(S): at 21:28

## 2025-08-01 RX ADMIN — Medication 1000 MILLILITER(S): at 06:46

## 2025-08-01 RX ADMIN — Medication 10 MILLIGRAM(S): at 13:23

## 2025-08-01 RX ADMIN — Medication 25 MICROGRAM(S): at 07:00

## 2025-08-01 RX ADMIN — Medication 4 MILLIGRAM(S): at 06:45

## 2025-08-01 RX ADMIN — Medication 2000 MILLIGRAM(S): at 23:46

## 2025-08-01 RX ADMIN — LOSARTAN POTASSIUM 100 MILLIGRAM(S): 100 TABLET, FILM COATED ORAL at 21:25

## 2025-08-01 RX ADMIN — Medication 25 MICROGRAM(S): at 06:30

## 2025-08-01 RX ADMIN — CALCIUM CHLORIDE 1000 MILLIGRAM(S): 100 INJECTION, SOLUTION INTRAVENOUS at 06:45

## 2025-08-02 ENCOUNTER — TRANSCRIPTION ENCOUNTER (OUTPATIENT)
Age: 85
End: 2025-08-02

## 2025-08-02 VITALS
OXYGEN SATURATION: 94 % | RESPIRATION RATE: 17 BRPM | SYSTOLIC BLOOD PRESSURE: 135 MMHG | DIASTOLIC BLOOD PRESSURE: 66 MMHG | HEART RATE: 75 BPM

## 2025-08-02 LAB
ALBUMIN SERPL ELPH-MCNC: 3.6 G/DL — SIGNIFICANT CHANGE UP (ref 3.3–5.2)
ALBUMIN SERPL ELPH-MCNC: 3.6 G/DL — SIGNIFICANT CHANGE UP (ref 3.3–5.2)
ALP SERPL-CCNC: 75 U/L — SIGNIFICANT CHANGE UP (ref 40–120)
ALP SERPL-CCNC: 79 U/L — SIGNIFICANT CHANGE UP (ref 40–120)
ALT FLD-CCNC: 14 U/L — SIGNIFICANT CHANGE UP
ALT FLD-CCNC: 19 U/L — SIGNIFICANT CHANGE UP
ANION GAP SERPL CALC-SCNC: 12 MMOL/L — SIGNIFICANT CHANGE UP (ref 5–17)
ANION GAP SERPL CALC-SCNC: 12 MMOL/L — SIGNIFICANT CHANGE UP (ref 5–17)
AST SERPL-CCNC: 18 U/L — SIGNIFICANT CHANGE UP
AST SERPL-CCNC: 20 U/L — SIGNIFICANT CHANGE UP
BASOPHILS # BLD AUTO: 0.08 K/UL — SIGNIFICANT CHANGE UP (ref 0–0.2)
BASOPHILS NFR BLD AUTO: 0.3 % — SIGNIFICANT CHANGE UP (ref 0–2)
BILIRUB SERPL-MCNC: 0.4 MG/DL — SIGNIFICANT CHANGE UP (ref 0.4–2)
BILIRUB SERPL-MCNC: 0.5 MG/DL — SIGNIFICANT CHANGE UP (ref 0.4–2)
BUN SERPL-MCNC: 17 MG/DL — SIGNIFICANT CHANGE UP (ref 8–20)
BUN SERPL-MCNC: 17.4 MG/DL — SIGNIFICANT CHANGE UP (ref 8–20)
CALCIUM SERPL-MCNC: 8.4 MG/DL — SIGNIFICANT CHANGE UP (ref 8.4–10.5)
CALCIUM SERPL-MCNC: 8.7 MG/DL — SIGNIFICANT CHANGE UP (ref 8.4–10.5)
CHLORIDE SERPL-SCNC: 109 MMOL/L — HIGH (ref 96–108)
CHLORIDE SERPL-SCNC: 109 MMOL/L — HIGH (ref 96–108)
CO2 SERPL-SCNC: 22 MMOL/L — SIGNIFICANT CHANGE UP (ref 22–29)
CO2 SERPL-SCNC: 23 MMOL/L — SIGNIFICANT CHANGE UP (ref 22–29)
CREAT SERPL-MCNC: 0.74 MG/DL — SIGNIFICANT CHANGE UP (ref 0.5–1.3)
CREAT SERPL-MCNC: 0.8 MG/DL — SIGNIFICANT CHANGE UP (ref 0.5–1.3)
EGFR: 74 ML/MIN/1.73M2 — SIGNIFICANT CHANGE UP
EGFR: 74 ML/MIN/1.73M2 — SIGNIFICANT CHANGE UP
EGFR: 81 ML/MIN/1.73M2 — SIGNIFICANT CHANGE UP
EGFR: 81 ML/MIN/1.73M2 — SIGNIFICANT CHANGE UP
EOSINOPHIL # BLD AUTO: 0.15 K/UL — SIGNIFICANT CHANGE UP (ref 0–0.5)
EOSINOPHIL NFR BLD AUTO: 0.6 % — SIGNIFICANT CHANGE UP (ref 0–6)
GLUCOSE SERPL-MCNC: 101 MG/DL — HIGH (ref 70–99)
GLUCOSE SERPL-MCNC: 112 MG/DL — HIGH (ref 70–99)
HCT VFR BLD CALC: 37.6 % — SIGNIFICANT CHANGE UP (ref 34.5–45)
HGB BLD-MCNC: 12.3 G/DL — SIGNIFICANT CHANGE UP (ref 11.5–15.5)
IMM GRANULOCYTES # BLD AUTO: 0.1 K/UL — HIGH (ref 0–0.07)
IMM GRANULOCYTES NFR BLD AUTO: 0.4 % — SIGNIFICANT CHANGE UP (ref 0–0.9)
LYMPHOCYTES # BLD AUTO: 14.95 K/UL — HIGH (ref 1–3.3)
LYMPHOCYTES NFR BLD AUTO: 56.9 % — HIGH (ref 13–44)
MAGNESIUM SERPL-MCNC: 1.9 MG/DL — SIGNIFICANT CHANGE UP (ref 1.6–2.6)
MAGNESIUM SERPL-MCNC: 1.9 MG/DL — SIGNIFICANT CHANGE UP (ref 1.6–2.6)
MCHC RBC-ENTMCNC: 30 PG — SIGNIFICANT CHANGE UP (ref 27–34)
MCHC RBC-ENTMCNC: 32.7 G/DL — SIGNIFICANT CHANGE UP (ref 32–36)
MCV RBC AUTO: 91.7 FL — SIGNIFICANT CHANGE UP (ref 80–100)
MONOCYTES # BLD AUTO: 1.1 K/UL — HIGH (ref 0–0.9)
MONOCYTES NFR BLD AUTO: 4.2 % — SIGNIFICANT CHANGE UP (ref 2–14)
NEUTROPHILS # BLD AUTO: 9.88 K/UL — HIGH (ref 1.8–7.4)
NEUTROPHILS NFR BLD AUTO: 37.6 % — LOW (ref 43–77)
NRBC # BLD AUTO: 0 K/UL — SIGNIFICANT CHANGE UP (ref 0–0)
NRBC # FLD: 0 K/UL — SIGNIFICANT CHANGE UP (ref 0–0)
NRBC BLD AUTO-RTO: 0 /100 WBCS — SIGNIFICANT CHANGE UP (ref 0–0)
PHOSPHATE SERPL-MCNC: 3.7 MG/DL — SIGNIFICANT CHANGE UP (ref 2.4–4.7)
PLATELET # BLD AUTO: 205 K/UL — SIGNIFICANT CHANGE UP (ref 150–400)
PMV BLD: 10.3 FL — SIGNIFICANT CHANGE UP (ref 7–13)
POTASSIUM SERPL-MCNC: 3.9 MMOL/L — SIGNIFICANT CHANGE UP (ref 3.5–5.3)
POTASSIUM SERPL-MCNC: 4 MMOL/L — SIGNIFICANT CHANGE UP (ref 3.5–5.3)
POTASSIUM SERPL-SCNC: 3.9 MMOL/L — SIGNIFICANT CHANGE UP (ref 3.5–5.3)
POTASSIUM SERPL-SCNC: 4 MMOL/L — SIGNIFICANT CHANGE UP (ref 3.5–5.3)
PROT SERPL-MCNC: 5.8 G/DL — LOW (ref 6.6–8.7)
PROT SERPL-MCNC: 6 G/DL — LOW (ref 6.6–8.7)
RBC # BLD: 4.1 M/UL — SIGNIFICANT CHANGE UP (ref 3.8–5.2)
RBC # FLD: 13.6 % — SIGNIFICANT CHANGE UP (ref 10.3–14.5)
SODIUM SERPL-SCNC: 142 MMOL/L — SIGNIFICANT CHANGE UP (ref 135–145)
SODIUM SERPL-SCNC: 144 MMOL/L — SIGNIFICANT CHANGE UP (ref 135–145)
WBC # BLD: 21.54 K/UL — HIGH (ref 3.8–10.5)
WBC # FLD AUTO: 21.54 K/UL — HIGH (ref 3.8–10.5)

## 2025-08-02 PROCEDURE — 85027 COMPLETE CBC AUTOMATED: CPT

## 2025-08-02 PROCEDURE — C1785: CPT

## 2025-08-02 PROCEDURE — 87040 BLOOD CULTURE FOR BACTERIA: CPT

## 2025-08-02 PROCEDURE — 84439 ASSAY OF FREE THYROXINE: CPT

## 2025-08-02 PROCEDURE — C8929: CPT

## 2025-08-02 PROCEDURE — 84484 ASSAY OF TROPONIN QUANT: CPT

## 2025-08-02 PROCEDURE — C1887: CPT

## 2025-08-02 PROCEDURE — 80053 COMPREHEN METABOLIC PANEL: CPT

## 2025-08-02 PROCEDURE — 84480 ASSAY TRIIODOTHYRONINE (T3): CPT

## 2025-08-02 PROCEDURE — C1779: CPT

## 2025-08-02 PROCEDURE — 83605 ASSAY OF LACTIC ACID: CPT

## 2025-08-02 PROCEDURE — 96375 TX/PRO/DX INJ NEW DRUG ADDON: CPT

## 2025-08-02 PROCEDURE — C1898: CPT

## 2025-08-02 PROCEDURE — 36415 COLL VENOUS BLD VENIPUNCTURE: CPT

## 2025-08-02 PROCEDURE — 33208 INSRT HEART PM ATRIAL & VENT: CPT

## 2025-08-02 PROCEDURE — 86901 BLOOD TYPING SEROLOGIC RH(D): CPT

## 2025-08-02 PROCEDURE — 33210 INSERT ELECTRD/PM CATH SNGL: CPT

## 2025-08-02 PROCEDURE — 84100 ASSAY OF PHOSPHORUS: CPT

## 2025-08-02 PROCEDURE — 84436 ASSAY OF TOTAL THYROXINE: CPT

## 2025-08-02 PROCEDURE — 99239 HOSP IP/OBS DSCHRG MGMT >30: CPT

## 2025-08-02 PROCEDURE — 87640 STAPH A DNA AMP PROBE: CPT

## 2025-08-02 PROCEDURE — 87641 MR-STAPH DNA AMP PROBE: CPT

## 2025-08-02 PROCEDURE — 93005 ELECTROCARDIOGRAM TRACING: CPT

## 2025-08-02 PROCEDURE — 82962 GLUCOSE BLOOD TEST: CPT

## 2025-08-02 PROCEDURE — 83735 ASSAY OF MAGNESIUM: CPT

## 2025-08-02 PROCEDURE — 86900 BLOOD TYPING SEROLOGIC ABO: CPT

## 2025-08-02 PROCEDURE — 71045 X-RAY EXAM CHEST 1 VIEW: CPT

## 2025-08-02 PROCEDURE — 85610 PROTHROMBIN TIME: CPT

## 2025-08-02 PROCEDURE — 96374 THER/PROPH/DIAG INJ IV PUSH: CPT

## 2025-08-02 PROCEDURE — 84443 ASSAY THYROID STIM HORMONE: CPT

## 2025-08-02 PROCEDURE — 86850 RBC ANTIBODY SCREEN: CPT

## 2025-08-02 PROCEDURE — 85025 COMPLETE CBC W/AUTO DIFF WBC: CPT

## 2025-08-02 PROCEDURE — 99291 CRITICAL CARE FIRST HOUR: CPT | Mod: 25

## 2025-08-02 PROCEDURE — 93010 ELECTROCARDIOGRAM REPORT: CPT

## 2025-08-02 PROCEDURE — 71046 X-RAY EXAM CHEST 2 VIEWS: CPT

## 2025-08-02 PROCEDURE — 71046 X-RAY EXAM CHEST 2 VIEWS: CPT | Mod: 26

## 2025-08-02 PROCEDURE — 85730 THROMBOPLASTIN TIME PARTIAL: CPT

## 2025-08-02 RX ADMIN — Medication 50 MICROGRAM(S): at 04:00

## 2025-08-02 RX ADMIN — Medication 650 MILLIGRAM(S): at 03:59

## 2025-08-02 RX ADMIN — LOSARTAN POTASSIUM 100 MILLIGRAM(S): 100 TABLET, FILM COATED ORAL at 05:46

## 2025-08-02 RX ADMIN — Medication 650 MILLIGRAM(S): at 05:30

## 2025-08-02 RX ADMIN — Medication 30 MILLIGRAM(S): at 06:37

## 2025-08-02 RX ADMIN — Medication 1 APPLICATION(S): at 04:00

## 2025-08-03 LAB
ANISOCYTOSIS BLD QL: SLIGHT — SIGNIFICANT CHANGE UP
BASOPHILS # BLD MANUAL: 0 K/UL — SIGNIFICANT CHANGE UP (ref 0–0.2)
BASOPHILS NFR BLD MANUAL: 0 % — SIGNIFICANT CHANGE UP (ref 0–2)
BURR CELLS BLD QL SMEAR: SLIGHT — SIGNIFICANT CHANGE UP
ELLIPTOCYTES BLD QL SMEAR: SLIGHT — SIGNIFICANT CHANGE UP
EOSINOPHIL # BLD MANUAL: 0.58 K/UL — HIGH (ref 0–0.5)
EOSINOPHIL NFR BLD MANUAL: 2.2 % — SIGNIFICANT CHANGE UP (ref 0–6)
LYMPHOCYTES # BLD MANUAL: 8.67 K/UL — HIGH (ref 1–3.3)
LYMPHOCYTES # SPEC AUTO: 1.3 % — HIGH (ref 0–0)
LYMPHOCYTES NFR BLD MANUAL: 33 % — SIGNIFICANT CHANGE UP (ref 13–44)
MANUAL OTHER LYMPHOCYTES #: 0.34 K/UL — HIGH (ref 0–0)
MANUAL SMEAR VERIFICATION: SIGNIFICANT CHANGE UP
MONOCYTES # BLD MANUAL: 1.13 K/UL — HIGH (ref 0–0.9)
MONOCYTES NFR BLD MANUAL: 4.3 % — SIGNIFICANT CHANGE UP (ref 2–14)
NEUTROPHILS # BLD MANUAL: 15.55 K/UL — HIGH (ref 1.8–7.4)
NEUTROPHILS NFR BLD MANUAL: 59.2 % — SIGNIFICANT CHANGE UP (ref 43–77)
OVALOCYTES BLD QL SMEAR: SLIGHT — SIGNIFICANT CHANGE UP
PLAT MORPH BLD: NORMAL — SIGNIFICANT CHANGE UP
POIKILOCYTOSIS BLD QL AUTO: SLIGHT — SIGNIFICANT CHANGE UP
RBC BLD AUTO: ABNORMAL
SMUDGE CELLS # BLD: PRESENT

## 2025-08-04 DIAGNOSIS — E03.9 HYPOTHYROIDISM, UNSPECIFIED: ICD-10-CM

## 2025-08-04 DIAGNOSIS — M81.0 AGE-RELATED OSTEOPOROSIS W/OUT CURRENT PATHOLOGICAL FRACTURE: ICD-10-CM

## 2025-08-04 DIAGNOSIS — Z98.61 CORONARY ANGIOPLASTY STATUS: ICD-10-CM

## 2025-08-04 DIAGNOSIS — R42 DIZZINESS AND GIDDINESS: ICD-10-CM

## 2025-08-04 RX ORDER — LEVOTHYROXINE SODIUM 50 UG/1
50 TABLET ORAL DAILY
Refills: 0 | Status: ACTIVE | COMMUNITY
Start: 2025-08-04

## 2025-08-04 RX ORDER — CLOPIDOGREL BISULFATE 75 MG/1
75 TABLET, FILM COATED ORAL DAILY
Qty: 90 | Refills: 0 | Status: ACTIVE | COMMUNITY
Start: 2025-08-04

## 2025-08-04 RX ORDER — LOSARTAN POTASSIUM 100 MG/1
100 TABLET, FILM COATED ORAL
Qty: 90 | Refills: 2 | Status: ACTIVE | COMMUNITY
Start: 2025-08-04

## 2025-08-06 LAB
CULTURE RESULTS: SIGNIFICANT CHANGE UP
CULTURE RESULTS: SIGNIFICANT CHANGE UP
SPECIMEN SOURCE: SIGNIFICANT CHANGE UP
SPECIMEN SOURCE: SIGNIFICANT CHANGE UP

## 2025-08-07 ENCOUNTER — APPOINTMENT (OUTPATIENT)
Dept: CARDIOLOGY | Facility: CLINIC | Age: 85
End: 2025-08-07
Payer: MEDICARE

## 2025-08-07 VITALS
BODY MASS INDEX: 30.73 KG/M2 | DIASTOLIC BLOOD PRESSURE: 74 MMHG | OXYGEN SATURATION: 97 % | HEIGHT: 64 IN | HEART RATE: 66 BPM | SYSTOLIC BLOOD PRESSURE: 162 MMHG | WEIGHT: 180 LBS

## 2025-08-07 DIAGNOSIS — I44.30 UNSPECIFIED ATRIOVENTRICULAR BLOCK: ICD-10-CM

## 2025-08-07 PROCEDURE — 99214 OFFICE O/P EST MOD 30 MIN: CPT

## 2025-08-07 PROCEDURE — 93000 ELECTROCARDIOGRAM COMPLETE: CPT

## 2025-08-07 PROCEDURE — G2211 COMPLEX E/M VISIT ADD ON: CPT

## 2025-08-07 RX ORDER — HYDROCHLOROTHIAZIDE 12.5 MG/1
12.5 TABLET ORAL DAILY
Refills: 0 | Status: ACTIVE | COMMUNITY

## 2025-08-07 RX ORDER — CARVEDILOL 6.25 MG/1
6.25 TABLET, FILM COATED ORAL TWICE DAILY
Qty: 180 | Refills: 3 | Status: ACTIVE | COMMUNITY
Start: 2025-08-07 | End: 1900-01-01

## 2025-08-07 RX ORDER — NIFEDIPINE 30 MG/1
30 TABLET, EXTENDED RELEASE ORAL
Refills: 0 | Status: DISCONTINUED | COMMUNITY
Start: 2025-08-07 | End: 2025-08-07

## 2025-08-07 RX ORDER — NIFEDIPINE 30 MG/1
30 TABLET, EXTENDED RELEASE ORAL DAILY
Qty: 90 | Refills: 3 | Status: DISCONTINUED | COMMUNITY
Start: 2025-08-04 | End: 2025-08-07

## 2025-08-14 ENCOUNTER — APPOINTMENT (OUTPATIENT)
Dept: CARDIOLOGY | Facility: CLINIC | Age: 85
End: 2025-08-14
Payer: MEDICARE

## 2025-08-14 VITALS
OXYGEN SATURATION: 96 % | HEIGHT: 64 IN | DIASTOLIC BLOOD PRESSURE: 76 MMHG | WEIGHT: 178 LBS | HEART RATE: 61 BPM | SYSTOLIC BLOOD PRESSURE: 134 MMHG | BODY MASS INDEX: 30.39 KG/M2

## 2025-08-14 DIAGNOSIS — Z95.0 PRESENCE OF CARDIAC PACEMAKER: ICD-10-CM

## 2025-08-14 DIAGNOSIS — I10 ESSENTIAL (PRIMARY) HYPERTENSION: ICD-10-CM

## 2025-08-14 DIAGNOSIS — I25.10 ATHEROSCLEROTIC HEART DISEASE OF NATIVE CORONARY ARTERY W/OUT ANGINA PECTORIS: ICD-10-CM

## 2025-08-14 DIAGNOSIS — E78.5 HYPERLIPIDEMIA, UNSPECIFIED: ICD-10-CM

## 2025-08-14 DIAGNOSIS — R00.1 BRADYCARDIA, UNSPECIFIED: ICD-10-CM

## 2025-08-14 DIAGNOSIS — C91.10 CHRONIC LYMPHOCYTIC LEUKEMIA OF B-CELL TYPE NOT HAVING ACHIEVED REMISSION: ICD-10-CM

## 2025-08-14 PROCEDURE — G2211 COMPLEX E/M VISIT ADD ON: CPT

## 2025-08-14 PROCEDURE — 93000 ELECTROCARDIOGRAM COMPLETE: CPT

## 2025-08-14 PROCEDURE — 99214 OFFICE O/P EST MOD 30 MIN: CPT

## 2025-08-26 ENCOUNTER — APPOINTMENT (OUTPATIENT)
Dept: CARDIOLOGY | Facility: CLINIC | Age: 85
End: 2025-08-26

## 2025-09-12 ENCOUNTER — APPOINTMENT (OUTPATIENT)
Dept: ELECTROPHYSIOLOGY | Facility: CLINIC | Age: 85
End: 2025-09-12
Payer: MEDICARE

## 2025-09-12 VITALS
OXYGEN SATURATION: 97 % | HEIGHT: 64 IN | HEART RATE: 59 BPM | SYSTOLIC BLOOD PRESSURE: 160 MMHG | DIASTOLIC BLOOD PRESSURE: 90 MMHG | BODY MASS INDEX: 30.73 KG/M2 | WEIGHT: 180 LBS

## 2025-09-12 DIAGNOSIS — Z95.0 PRESENCE OF CARDIAC PACEMAKER: ICD-10-CM

## 2025-09-12 PROCEDURE — 93000 ELECTROCARDIOGRAM COMPLETE: CPT | Mod: 59

## 2025-09-12 PROCEDURE — 93288 INTERROG EVL PM/LDLS PM IP: CPT

## 2025-09-12 PROCEDURE — 99214 OFFICE O/P EST MOD 30 MIN: CPT | Mod: 24

## 2025-09-15 ENCOUNTER — APPOINTMENT (OUTPATIENT)
Dept: CARDIOLOGY | Facility: CLINIC | Age: 85
End: 2025-09-15
Payer: COMMERCIAL

## 2025-09-15 VITALS
WEIGHT: 179 LBS | HEART RATE: 60 BPM | BODY MASS INDEX: 30.56 KG/M2 | SYSTOLIC BLOOD PRESSURE: 170 MMHG | OXYGEN SATURATION: 96 % | DIASTOLIC BLOOD PRESSURE: 90 MMHG | HEIGHT: 64 IN

## 2025-09-15 DIAGNOSIS — I25.10 ATHEROSCLEROTIC HEART DISEASE OF NATIVE CORONARY ARTERY W/OUT ANGINA PECTORIS: ICD-10-CM

## 2025-09-15 DIAGNOSIS — R00.1 BRADYCARDIA, UNSPECIFIED: ICD-10-CM

## 2025-09-15 DIAGNOSIS — E78.5 HYPERLIPIDEMIA, UNSPECIFIED: ICD-10-CM

## 2025-09-15 DIAGNOSIS — I10 ESSENTIAL (PRIMARY) HYPERTENSION: ICD-10-CM

## 2025-09-15 PROCEDURE — 93000 ELECTROCARDIOGRAM COMPLETE: CPT

## 2025-09-15 PROCEDURE — 99204 OFFICE O/P NEW MOD 45 MIN: CPT

## 2025-09-15 PROCEDURE — G2211 COMPLEX E/M VISIT ADD ON: CPT | Mod: NC
